# Patient Record
Sex: MALE | Race: WHITE | NOT HISPANIC OR LATINO | Employment: FULL TIME | URBAN - METROPOLITAN AREA
[De-identification: names, ages, dates, MRNs, and addresses within clinical notes are randomized per-mention and may not be internally consistent; named-entity substitution may affect disease eponyms.]

---

## 2018-01-12 NOTE — RESULT NOTES
Verified Results  * MRI LUMBAR SPINE WO CONTRAST [id number] 09:17AM Bernice Robison Order Number: [id number]     Test Name Result Flag Reference   MRI LUMBAR SPINE [address] (Report)     This is a summary report  The complete report is available in the patient's medical record  If you cannot access the medical record, please contact the sending organization for a detailed fax or copy  MRI LUMBAR SPINE WITHOUT CONTRAST     INDICATION: Weight lifting, low back pain, M54 5     COMPARISON: X-ray 1/19/2016     TECHNIQUE: Sagittal T1, sagittal T2, sagittal inversion recovery, axial T1 and axial T2, coronal T2       IMAGE QUALITY: Diagnostic     FINDINGS:     ALIGNMENT: Normal alignment of the lumbar spine  No compression fracture  No spondylolysis or spondylolisthesis  No scoliosis  MARROW SIGNAL: Neuro changes, largely reactive, chronic in nature present anteriorly at the L5-S1 level  Minor irregularity of both endplates     DISTAL CORD AND CONUS: Normal size and signal within the distal cord and conus  The conus ends at the L1 level  PARASPINAL SOFT TISSUES: Paraspinal soft tissues are unremarkable  SACRUM: Normal signal within the sacrum  No evidence of insufficiency or stress fracture  LOWER THORACIC DISC SPACES: Normal disc height and signal  No disc herniation, canal stenosis or foraminal narrowing  LUMBAR DISC SPACES:        L1-L2: Normal      L2-L3: Normal      L3-L4: Normal      L4-L5: Minor desiccation of the disc far left lateral annular fissure no definite root compression  L5-S1: Minor bilateral facet arthrosis, small marginal osteophytes, circumferential bulge       IMPRESSION:     Minor, noncompressive degenerative changes  Evolving endplate changes F8-M7  Workstation performed: [id number]     Signed by:    Dev Clemens MD   2/8/16

## 2018-01-15 NOTE — RESULT NOTES
Message   Recorded as Task   Date: 02/09/2016 08:02 AM, Created By: Arcelia Burr   Task Name: Call Patient with results   Assigned To: Cecily Justin   Regarding Patient: Hay Clark, Status: In Progress   Comment:    Domingo Jaquez - 09 Feb 2016 8:02 AM     Patient Phone: [phone]    minor non-decompressive changes, how where is patients pain   Aminta Lechuga - 11 Feb 2016 10:04 AM     TASK REASSIGNED: Previously Assigned To Domingo Jaquez Manette - 11 Feb 2016 10:11 AM     TASK EDITED  s/w pt's mother, per release of info on file  States pt is sleeping - works nights  Will c/b after noon  Provided c/b number  Will await pt's cb   Jono Castro - 11 Feb 2016 10:24 AM     TASK EDITED  s/w pt, Per pt, original pain was throughout his back - 4 mos ago  Currently, pain is in in low back to tailbone and "hip flexors"  Left worse than right  Per pt, pain is worst when he wakes up in the morning  Some improvement w/ rest and muscle relaxers  Advised pt, will d/w Dr Jordyn Batres and cb  Domingo Jaquez - 11 Feb 2016 10:32 AM     TASK REPLIED TO: Previously Assigned To Domingo Jaquez  would schedule b/l S1  tfesi X 2   Jono Castro - 11 Feb 2016 4:38 PM     TASK REASSIGNED: Previously Assigned To [facility] [place] [facility]   Cecily Justin - 15 Feb 2016 10:56 AM     TASK REASSIGNED: Previously Assigned To [facility] sched,Team   Cecily Justin - 16 Feb 2016 1:40 PM     TASK EDITED  pt scheduled for B/l s1 TFESI on 2/19/16 and in 2 wks on 3/4/2016  pt denies bld thinners/antbx aware to call office if sick or put on antbx to r/s procedures  Rosella Goldmann npo 1 hr prior   need for , wear comfortable pants   pt verbally understands instructions

## 2018-01-15 NOTE — RESULT NOTES
Message   Recorded as Task   Date: 02/22/2016 08:40 AM, Created By: [place]   Task Name: Follow Up   Assigned To: [facility]   Regarding Patient: Ahmet Bateman, Status: In Progress   Comment:    Aminta Lechuga - 22 Feb 2016 8:40 AM     TASK CREATED  Pt  s/p B/L S1 TFESI #1 on 2/19/16 w/ Dr Rosey Corea  B/L S1 TFESI #2 scheduled on 3/4/16 at 200 am w/ Dr Rosey Corea    Please contact pt  on 2/26/16     Suman Everett - 01 Mar 2016 2:46 PM     TASK EDITED  [facility] advising pt to call back   Alyssa Sanchez - 02 Mar 2016 2:47 PM     TASK EDITED  S/w woman-pt is at work, he goes in at 2:00  -ok to call back tomorrow before 2:00   Alyssa Sanchez - 07 Mar 2016 9:18 AM     TASK EDITED  Pt arruived for B/L S1 TFESI #2 scheduled on 3/4/16

## 2018-01-18 NOTE — PROGRESS NOTES
Assessment    1  Bilateral low back pain without sciatica (724 2) (M54 5)   2  Spondylosis of lumbar region without myelopathy or radiculopathy (721 3) (M47 816)   3  Myofascial pain syndrome (729 1) (M79 1)    Plan  Bilateral low back pain without sciatica    · PredniSONE 5 MG Oral Tablet   Rx By: Sherryle Lurie; Dispense: 6 Days ; #:21 Tablet; Refill: 0; For: Bilateral low back pain without sciatica; TARAH = N; Sent To: [facility]; Last Updated By: Konrad Fairchild; 2/2/2016 11:00:53 AM   · From  Hydrocodone-Ibuprofen 7 5-200 MG Oral Tablet TAKE 1 TABLET EVERY  8 HOURS AS NEEDED FOR PAIN To Hydrocodone-Ibuprofen 7 5-200 MG Oral Tablet  (Vicoprofen) TAKE 1 TABLET TWICE DAILY PRN for Pain   Rx By: Konrad Fairchild; Dispense: 30 Days ; #:60 Tablet; Refill: 0; For: Bilateral low back pain without sciatica; TARAH = N; Print Rx   · Cyclobenzaprine HCl - 10 MG Oral Tablet; TAKE 1 TABLET 3 TIMES DAILY AS  NEEDED   Rx By: Jorge Luis Bains; Dispense: 30 Days ; #:90 Tablet; Refill: 2; For: Bilateral low back pain without sciatica; TARAH = N; Verified Transmission to [facility]; Last Updated By: Konrad Fairchild; 2/2/2016 11:05:15 AM  Bilateral low back pain without sciatica, Myofascial pain syndrome    · Follow-up PRN Evaluation and Treatment  Follow-up  Status: Complete  Done:  [id number]   Ordered; For: Bilateral low back pain without sciatica, Myofascial pain syndrome; Ordered By: Konrad Fairchild Performed:  Due: [id number]  Bilateral low back pain without sciatica, Myofascial pain syndrome, Spondylosis of  lumbar region without myelopathy or radiculopathy    · Procedure Flowsheet; Status:Complete;   Done: 65CKE9772 11:59AM   Performed: In Office; Albina La; Last Updated Regine Montoya; 2/2/2016 11:59:29 AM;Ordered;   For:Bilateral low back pain without sciatica, Myofascial pain syndrome, Spondylosis of lumbar region without myelopathy or radiculopathy; Ordered By:Carlos Eduardo King;    Discussion/Summary    While the patient was in the office today, I did review with him the results of his most recent x-ray the lumbosacral spine and explained to him that did show mild degenerative changes most significantly at the L5-S1 level, however, the changes were typically normal for his age  The patient that at this point I feel it is imperative that he proceeds with the MRI of the lumbosacral spine as soon as possible so we can see what else is going on and have a better idea as to what the next step is  I advised the patient that as soon as we have the results of the MRI, our office will give him a call to review the results and discuss any other treatment primary recommendations, which may include an interlaminar lumbar epidural steroid injection with Dr Meryl Munoz versus lumbar facet joint injections  The patient was agreeable and verbalized an understanding  With regards to his medication regimen, explained to the patient that we do not prescribe medications without a baseline urine drug screen  The patient was agreeable and a baseline urine drug screen was collected at today's visit  I explained the patient that since the point of care testing results were appropriate, I would feel comfortable at this point giving him a 30 day supply of Vicoprofen one tab twice a day when necessary for breakthrough pain  However, explained to 2 the patient that I do not feel the Vicoprofen is a good long-term medication and that he is to use it on a sparing when necessary basis  The patient is to continue with the when necessary Flexeril as well  The patient was agreeable and verbalized an understanding  I encouraged the patient continue with a home exercise and stretching program as well as the home TENS unit  The patient was agreeable and verbalized an understanding  Possible side effects of new medications were reviewed with the patient/guardian today  The treatment plan was reviewed with the patient/guardian   The patient/guardian understands and agrees with the treatment plan   The patient was counseled regarding diagnostic results, instructions for management, prognosis, patient and family education, impressions, risks and benefits of treatment options and importance of compliance with treatment  total time of encounter was 25 minutes  Chief Complaint    1  Back Pain  Low back and left hip/leg pain, stable  History of Present Illness  The patient presents today for a follow-up office visit  He was seen for his initial consultation and evaluation of his left-sided greater than right low back pain with occasional left lower extremity radicular symptoms on January 19, 2016 with Dr Vasyl Xavier  Since then the patient did proceed with the x-ray of the lumbar sacral spine which did show some mild degenerative changes, however, just received the prior authorization from his insurance company in order to go ahead with the MRI, which she has not yet scheduled  However, the patient did proceed with a titrating dose of oral prednisone as Dr Vasyl Xavier had recommended, which the patient does feel has helped at least a significant sharp and burning pain and although he still has constant pain every day, it is definitely not as intense as it was prior to the prednisone and not as severe  However, he reports that he wakes up with the pain and he feels the pain slowly worsens as the day goes on  The patient reports that he has been using the when necessary Vicoprofen and Flexeril that was previously prescribed by his primary care provider, however, he was instructed to follow-up with our office if we felt continuing any pain medication would be helpful  She reports that he was trying to use the Vicoprofen on a sparing basis as evident by the fact that prescribed and was given to him over a month ago and was not a full month supply   The patient reports that he has been trying to continue with physical therapy and home exercise and stretching program as well as his home TENS unit to do everything he can do slowly and steadily get back to where he was with minimal to no back pain  The patient presents today to discuss his medication regimen and treatment plan  Susan Asencio presents with complaints of constant episodes of moderate bilateral lower back pain, described as sharp and burning, radiating to the bilateral buttock and left thigh  On a scale of 1 to 10, the patient rates the pain as 8  Symptoms are unchanged  Review of Systems    Constitutional: no fever, no recent weight gain and no recent weight loss  Eyes: no double vision and no blurry vision  Cardiovascular: no chest pain, no palpitations and no lower extremity edema  Respiratory: no complaints of shortness of breath and no wheezing  Musculoskeletal: joint stiffness and decreased range of motion, but no difficulty walking, no muscle weakness, no joint swelling, no limb swelling` and no pain in extremity  Neurological: no dizziness, no difficulty swallowing, no memory loss, no loss of consciousness and no seizures  Gastrointestinal: no nausea, no vomiting, no constipation and no diarrhea  Genitourinary: no difficulty initiating urine stream, no genital pain and no frequent urination  Integumentary: no complaints of skin rash  Psychiatric: no depression  Endocrine: no excessive thirst, no adrenal disease, no hypothyroidism and no hyperthyroidism  Hematologic/Lymphatic: no tendency for easy bruising and no tendency for easy bleeding  Active Problems    1  Abnormal liver enzymes (790 5) (R74 8)   2  Abnormal renal function (593 9) (N28 9)   3  Bilateral low back pain without sciatica (724 2) (M54 5)    Past Medical History    1  History of No significant past medical history    The active problems and past medical history were reviewed and updated today  Surgical History    The surgical history was reviewed and updated today  Family History    1   Family history of essential hypertension (V17 49) (Z82 49)   2  Family history of rheumatoid arthritis (V17 7) (Z82 61)    3  Family history of cardiac disorder (V17 49) (Z82 49)   4  Family history of malignant neoplasm (V16 9) (Z80 9)    5  Family history of Cerebrovascular accident (CVA) due to other mechanism   6  Family history of arthritis (V17 7) (Z82 61)   7  Family history of cardiac disorder (V17 49) (Z82 49)   8  Family history of diabetes mellitus (V18 0) (Z83 3)   9  Family history of essential hypertension (V17 49) (Z82 49)   10  Family history of thyroid disease (V18 19) (Z83 49)   11  Family history of Neuropathy, generalized    The family history was reviewed and updated today  Social History    · Current every day smoker (305 1) (F17 200)   · Non drinker / no alcohol use   · Single   · Smoking (305 1) (F17 200)  The social history was reviewed and updated today  The social history was reviewed and is unchanged  Current Meds   1  Cyclobenzaprine HCl - 10 MG Oral Tablet; TAKE 1 TABLET 3 TIMES DAILY AS NEEDED; Therapy: [id number] to ([facility])  Requested for: 68VBJ0197; Last   Rx:[id number] Ordered   2  Hydrocodone-Ibuprofen 7 5-200 MG Oral Tablet; TAKE 1 TABLET EVERY 8 HOURS AS   NEEDED FOR PAIN;   Therapy: 27SVE1124 to (Evaluate:24Jan2016); Last UC:[id number] Ordered   3  PredniSONE 5 MG Oral Tablet; Take as directed according to titration schedule given; Therapy: 28TLA2036 to (Evaluate:25Jan2016)  Requested for: 25XSO0332; Last   Rx:19Jan2016 Ordered    The medication list was reviewed and updated today  Allergies    1   No Known Drug Allergies    Vitals  Vital Signs [Data Includes: Current Encounter]    Recorded: 16XND3288 10:42AM   Temperature 98 5 F   Heart Rate 003   Systolic 838   Diastolic 78   Height 5 ft 10 in   Weight 216 lb 8 0 oz   BMI Calculated 31 06   BSA Calculated 2 16   Pain Scale 8     Physical Exam    Constitutional   General appearance: Well developed, well nourished, alert, in no distress, non-toxic and no overt pain behavior  Eyes   Sclera: anicteric   HEENT   Hearing grossly intact  Neck   Neck: Supple, symmetric, trachea midline, no masses  Pulmonary   Respiratory effort: Even and unlabored  Cardiovascular   Examination of extremities: No edema or pitting edema present  Skin   Skin and subcutaneous tissue: Normal without rashes or lesions, well hydrated  Psychiatric   Mood and affect: Mood and affect appropriate  Neurologic Motor Tone:    Cranial nerves: Cranial nerves II-XII grossly intact  Slightly antalgic, but steady gait without the use of any assistive devices     Musculoskeletal       Results/Data  Encounter Results   Procedure Flowsheet [id number] 11:59AM Gail Sylvester     Test Name Result Flag Reference   Urine Drug Screen Performed Date [id number]         Future Appointments    Date/Time Provider Specialty Site   02/04/2016 09:00 AM Bella Cortes [facility] Internal Medicine [facility] INTERNAL MED     Signatures   Electronically signed by : QUINCY Rascon; Feb  3 2016  7:10AM EST                       (Author)    Electronically signed by : Len Woods DO; Feb  3 2016  8:14AM EST

## 2018-01-31 ENCOUNTER — OFFICE VISIT (OUTPATIENT)
Dept: FAMILY MEDICINE CLINIC | Facility: HOSPITAL | Age: 43
End: 2018-01-31
Payer: COMMERCIAL

## 2018-01-31 VITALS
HEIGHT: 70 IN | SYSTOLIC BLOOD PRESSURE: 140 MMHG | BODY MASS INDEX: 31.21 KG/M2 | HEART RATE: 72 BPM | DIASTOLIC BLOOD PRESSURE: 80 MMHG | WEIGHT: 218 LBS

## 2018-01-31 DIAGNOSIS — R41.840 CONCENTRATION DEFICIT: ICD-10-CM

## 2018-01-31 DIAGNOSIS — F41.1 GENERALIZED ANXIETY DISORDER: Primary | ICD-10-CM

## 2018-01-31 PROCEDURE — 99214 OFFICE O/P EST MOD 30 MIN: CPT | Performed by: INTERNAL MEDICINE

## 2018-01-31 RX ORDER — ESCITALOPRAM OXALATE 10 MG/1
10 TABLET ORAL DAILY
Qty: 30 TABLET | Refills: 5 | Status: SHIPPED | OUTPATIENT
Start: 2018-01-31 | End: 2018-03-20 | Stop reason: SDUPTHER

## 2018-01-31 RX ORDER — ESCITALOPRAM OXALATE 10 MG/1
10 TABLET ORAL DAILY
Qty: 30 TABLET | Refills: 5 | Status: SHIPPED | OUTPATIENT
Start: 2018-01-31 | End: 2018-01-31 | Stop reason: SDUPTHER

## 2018-01-31 RX ORDER — CLONAZEPAM 0.5 MG/1
0.5 TABLET ORAL 2 TIMES DAILY
Qty: 60 TABLET | Refills: 0 | Status: SHIPPED | OUTPATIENT
Start: 2018-01-31 | End: 2018-03-20 | Stop reason: SDUPTHER

## 2018-01-31 NOTE — PROGRESS NOTES
Assessment/Plan:         Problem List Items Addressed This Visit        Other    Generalized anxiety disorder - Primary    Relevant Medications    clonazePAM (KlonoPIN) 0 5 mg tablet    escitalopram (LEXAPRO) 10 mg tablet    Other Relevant Orders    CBC and differential    Comprehensive metabolic panel    TSH, 3rd generation with T4 reflex      Other Visit Diagnoses     Concentration deficit                Subjective:      Patient ID: Yamilet Zavaleta is a 40 y o  male  He reports he had some anxiety in 2010 and on meds  briefly at that time but does not remember name of medication  He broke up with his girlfriend after 3 year relationship and tried breathing exercises and working out  He is having problems focusing at work and home  Has poor appetite and reports that his father and brother both have some anxiety and depression issues  He had seen counselor in past in [place]- moved here 3 years ago to help care for his mother  Working at [employer]- had to quit job 2 years ago due to back pain issues after seeing Dr Shayla Mason  Having sleep issues -g ets 3 hours then awakens and has trouble gettting back to sleep  Wakens with anxiety and feels like he has to throw up  HPI as above    The following portions of the patient's history were reviewed and updated as appropriate: current medications, past medical history and past surgical history  Review of Systems   Constitutional: Positive for appetite change and fatigue  Negative for fever  HENT: Negative  Eyes: Negative  Respiratory: Negative for apnea, cough and shortness of breath  Cardiovascular: Positive for palpitations  Feels racing heart beat in am   Gastrointestinal: Positive for nausea  Negative for abdominal distention and abdominal pain  Genitourinary: Negative  Musculoskeletal: Positive for back pain  Chronic lumbar issues- some improvement since  Change in job   Skin: Negative for rash     Neurological: Negative for dizziness, seizures, weakness and numbness  Psychiatric/Behavioral: Negative for confusion  Denies drug or alcohol abuse   All other systems reviewed and are negative  Objective:     Physical Exam   Constitutional: He is oriented to person, place, and time  He appears well-developed and well-nourished  He appears distressed  Legs moving and anxious   HENT:   Head: Normocephalic and atraumatic  Eyes: Conjunctivae are normal  Pupils are equal, round, and reactive to light  Right eye exhibits no discharge  Neck: No JVD present  Cardiovascular: Normal rate and regular rhythm  Exam reveals no gallop  No murmur heard  Pulmonary/Chest: No respiratory distress  He has no rales  Abdominal: Soft  Bowel sounds are normal    Musculoskeletal: He exhibits no edema or tenderness  Lymphadenopathy:     He has no cervical adenopathy  Neurological: He is alert and oriented to person, place, and time  Coordination normal    Skin: Skin is warm and dry  No erythema  Psychiatric: Judgment and thought content normal    Speech is clear, good eye contact   Nursing note and vitals reviewed

## 2018-03-20 ENCOUNTER — TELEPHONE (OUTPATIENT)
Dept: FAMILY MEDICINE CLINIC | Facility: HOSPITAL | Age: 43
End: 2018-03-20

## 2018-03-20 ENCOUNTER — OFFICE VISIT (OUTPATIENT)
Dept: FAMILY MEDICINE CLINIC | Facility: HOSPITAL | Age: 43
End: 2018-03-20
Payer: COMMERCIAL

## 2018-03-20 VITALS
WEIGHT: 207 LBS | BODY MASS INDEX: 29.63 KG/M2 | HEART RATE: 72 BPM | SYSTOLIC BLOOD PRESSURE: 140 MMHG | HEIGHT: 70 IN | TEMPERATURE: 95.3 F | RESPIRATION RATE: 16 BRPM | DIASTOLIC BLOOD PRESSURE: 72 MMHG

## 2018-03-20 DIAGNOSIS — J40 TRACHEOBRONCHITIS: ICD-10-CM

## 2018-03-20 DIAGNOSIS — F41.1 GENERALIZED ANXIETY DISORDER: Primary | ICD-10-CM

## 2018-03-20 DIAGNOSIS — J01.10 ACUTE NON-RECURRENT FRONTAL SINUSITIS: ICD-10-CM

## 2018-03-20 PROCEDURE — 99213 OFFICE O/P EST LOW 20 MIN: CPT | Performed by: INTERNAL MEDICINE

## 2018-03-20 RX ORDER — ESCITALOPRAM OXALATE 20 MG/1
20 TABLET ORAL DAILY
Qty: 90 TABLET | Refills: 1 | Status: SHIPPED | OUTPATIENT
Start: 2018-03-20 | End: 2018-07-19 | Stop reason: SDUPTHER

## 2018-03-20 RX ORDER — CLONAZEPAM 0.5 MG/1
0.5 TABLET ORAL 2 TIMES DAILY
Qty: 60 TABLET | Refills: 0 | Status: SHIPPED | OUTPATIENT
Start: 2018-03-20 | End: 2018-06-25 | Stop reason: SDUPTHER

## 2018-03-20 RX ORDER — AZITHROMYCIN 250 MG/1
250 TABLET, FILM COATED ORAL DAILY
Qty: 6 TABLET | Refills: 0 | Status: SHIPPED | OUTPATIENT
Start: 2018-03-20 | End: 2018-03-20 | Stop reason: SDUPTHER

## 2018-03-20 RX ORDER — AZITHROMYCIN 250 MG/1
250 TABLET, FILM COATED ORAL DAILY
Qty: 6 TABLET | Refills: 0 | Status: SHIPPED | OUTPATIENT
Start: 2018-03-20 | End: 2018-03-25

## 2018-03-20 NOTE — ASSESSMENT & PLAN NOTE
Started on lexapro with last visit- feels that his panic feeling in am has decreased somewhat  Wears off somehat as it wears off later in day- discussed increase in dose to see if that improves symptoms    Had not picked  Up rx  For the klonipin- will refill for prn use

## 2018-03-20 NOTE — PROGRESS NOTES
Assessment/Plan:     Problem List Items Addressed This Visit        Other    Generalized anxiety disorder - Primary     Started on lexapro with last visit- feels that his panic feeling in am has decreased somewhat  Wears off somehat as it wears off later in day- discussed increase in dose to see if that improves symptoms  Had not picked  Up rx  For the klonipin- will refill for prn use             Other Visit Diagnoses     Acute non-recurrent frontal sinusitis                Subjective:      Patient ID: Alfonso Read is a 44 y o  male    1 uri- had sinus congestion and some cough for 7 days- had some discolored draiange 2 days ago and no fever- uses mucinex/ nyquil  But still having issues  Still occasional cigarettes        The following portions of the patient's history were reviewed and updated as appropriate: allergies, current medications and problem list      Review of Systems   Constitutional: Positive for fatigue  Negative for chills and fever  HENT: Positive for congestion, ear pain, sinus pressure and sore throat  Respiratory: Positive for cough  Gastrointestinal: Negative for nausea  All other systems reviewed and are negative  Objective:      Current Outpatient Prescriptions:     clonazePAM (KlonoPIN) 0 5 mg tablet, Take 1 tablet (0 5 mg total) by mouth 2 (two) times a day, Disp: 60 tablet, Rfl: 0    escitalopram (LEXAPRO) 10 mg tablet, Take 1 tablet (10 mg total) by mouth daily, Disp: 30 tablet, Rfl: 5    /72   Pulse 90   Resp 16   Ht 5' 3" (1 6 m)   Wt 65 3 kg (144 lb)   BMI 25 51 kg/m²          Physical Exam   Constitutional: He appears well-developed  He appears distressed  intermittant cough   HENT:   Head: Normocephalic  Left Ear: External ear normal    Mouth/Throat: Oropharynx is clear and moist    Mild phayrngeal injection   Eyes: Right eye exhibits no discharge  Left eye exhibits no discharge     Neck:   Tender submandibular lymphadenoapthy bilaterally Cardiovascular: Normal rate and regular rhythm  Exam reveals no friction rub  No murmur heard  Pulmonary/Chest: He has no rales  He exhibits no tenderness  Harsh upper airwya cough with some forced end expiratory wheezes   Lymphadenopathy:     He has cervical adenopathy

## 2018-06-25 ENCOUNTER — OFFICE VISIT (OUTPATIENT)
Dept: FAMILY MEDICINE CLINIC | Facility: HOSPITAL | Age: 43
End: 2018-06-25
Payer: COMMERCIAL

## 2018-06-25 VITALS
WEIGHT: 212 LBS | SYSTOLIC BLOOD PRESSURE: 128 MMHG | HEIGHT: 70 IN | DIASTOLIC BLOOD PRESSURE: 78 MMHG | HEART RATE: 64 BPM | BODY MASS INDEX: 30.35 KG/M2

## 2018-06-25 DIAGNOSIS — M79.641 PAIN IN BOTH HANDS: ICD-10-CM

## 2018-06-25 DIAGNOSIS — F41.1 GENERALIZED ANXIETY DISORDER: Primary | ICD-10-CM

## 2018-06-25 DIAGNOSIS — M79.642 PAIN IN BOTH HANDS: ICD-10-CM

## 2018-06-25 DIAGNOSIS — M51.26 BULGING LUMBAR DISC: ICD-10-CM

## 2018-06-25 PROCEDURE — 99214 OFFICE O/P EST MOD 30 MIN: CPT | Performed by: INTERNAL MEDICINE

## 2018-06-25 PROCEDURE — 3008F BODY MASS INDEX DOCD: CPT | Performed by: INTERNAL MEDICINE

## 2018-06-25 RX ORDER — CLONAZEPAM 0.5 MG/1
0.5 TABLET ORAL 2 TIMES DAILY
Qty: 60 TABLET | Refills: 0 | Status: SHIPPED | OUTPATIENT
Start: 2018-06-25

## 2018-06-25 RX ORDER — MELOXICAM 15 MG/1
15 TABLET ORAL DAILY
Qty: 30 TABLET | Refills: 5 | Status: SHIPPED | OUTPATIENT
Start: 2018-06-25

## 2018-06-25 NOTE — ASSESSMENT & PLAN NOTE
Has ongoing pain and some pain in feet at times  Works on hard floor and has some shooting pain down right buttocks  Busy with job demands  Using advil  And fish oil

## 2018-06-25 NOTE — PROGRESS NOTES
Assessment/Plan:           Problem List Items Addressed This Visit        Musculoskeletal and Integument    Bulging lumbar disc     Has ongoing pain and some pain in feet at times  Works on hard floor and has some shooting pain down right buttocks  Busy with job demands  Using advil  And fish oil  Other    Generalized anxiety disorder - Primary     Improving control- feels tired after working hard this week  Not using the klonipin often- prn  Has been taking the lexapro daily           Other Visit Diagnoses     Pain in both hands        Relevant Medications    meloxicam (MOBIC) 15 mg tablet    Other Relevant Orders    CAMILLE Screen w/ Reflex to Titer/Pattern    Sedimentation rate, automated    RF Screen w/ Reflex to Titer    LYME INDEX IGG/IGM, CSF            Subjective:      Patient ID: Army Rinaldi is a 44 y o  male    1  Back pain- continue with gym workout for core muscles  Some hand pain with work- has some swelling in hands and base of thumb bilaterally  2  Anxiety- doing well on lexapro 20 mg daily- not needing extra klonipin except prn use  3  Hand pain- will give meloxicam trial        The following portions of the patient's history were reviewed and updated as appropriate: allergies, current medications and problem list      Review of Systems   Constitutional: Negative for activity change and appetite change  HENT: Negative for congestion and ear discharge  Respiratory: Negative for apnea and chest tightness  Musculoskeletal: Positive for arthralgias and back pain  Neurological: Negative for dizziness and facial asymmetry  All other systems reviewed and are negative          Objective:      Current Outpatient Prescriptions:     clonazePAM (KlonoPIN) 0 5 mg tablet, Take 1 tablet (0 5 mg total) by mouth 2 (two) times a day, Disp: 60 tablet, Rfl: 0    escitalopram (LEXAPRO) 20 mg tablet, Take 1 tablet (20 mg total) by mouth daily for 90 days, Disp: 90 tablet, Rfl: 1   meloxicam (MOBIC) 15 mg tablet, Take 1 tablet (15 mg total) by mouth daily, Disp: 30 tablet, Rfl: 5         Physical Exam   Constitutional: He is oriented to person, place, and time  He appears well-developed and well-nourished  No distress  HENT:   Head: Normocephalic  Right Ear: External ear normal    Left Ear: External ear normal    Eyes: EOM are normal  Right eye exhibits no discharge  Left eye exhibits no discharge  Neck: No JVD present  Cardiovascular: Normal rate, regular rhythm and normal heart sounds  No murmur heard  Pulmonary/Chest: Effort normal and breath sounds normal  No stridor  No respiratory distress  He has no wheezes  Abdominal: Soft  Bowel sounds are normal  He exhibits no distension  There is no tenderness  Musculoskeletal: He exhibits tenderness  He exhibits no edema  Base of thumb arthritis   Neurological: He is alert and oriented to person, place, and time  No cranial nerve deficit  Coordination normal    Skin: Skin is warm and dry  No erythema  Psychiatric: He has a normal mood and affect  His behavior is normal    Some pressured speech   Nursing note and vitals reviewed

## 2018-06-25 NOTE — ASSESSMENT & PLAN NOTE
Improving control- feels tired after working hard this week  Not using the klonipin often- prn   Has been taking the lexapro daily

## 2018-07-19 DIAGNOSIS — F41.1 GENERALIZED ANXIETY DISORDER: ICD-10-CM

## 2018-07-19 RX ORDER — ESCITALOPRAM OXALATE 20 MG/1
20 TABLET ORAL DAILY
Qty: 90 TABLET | Refills: 0 | Status: SHIPPED | OUTPATIENT
Start: 2018-07-19 | End: 2018-10-17

## 2021-03-26 ENCOUNTER — OFFICE VISIT (OUTPATIENT)
Dept: URGENT CARE | Facility: CLINIC | Age: 46
End: 2021-03-26

## 2021-03-26 VITALS
RESPIRATION RATE: 18 BRPM | TEMPERATURE: 95.6 F | OXYGEN SATURATION: 97 % | WEIGHT: 215 LBS | BODY MASS INDEX: 31.84 KG/M2 | HEART RATE: 86 BPM | HEIGHT: 69 IN

## 2021-03-26 DIAGNOSIS — J20.9 ACUTE BRONCHITIS, UNSPECIFIED ORGANISM: Primary | ICD-10-CM

## 2021-03-26 PROCEDURE — 99213 OFFICE O/P EST LOW 20 MIN: CPT | Performed by: FAMILY MEDICINE

## 2021-03-26 RX ORDER — PREDNISONE 20 MG/1
40 TABLET ORAL DAILY
Qty: 10 TABLET | Refills: 0 | Status: SHIPPED | OUTPATIENT
Start: 2021-03-26 | End: 2021-03-31

## 2021-03-26 RX ORDER — AZITHROMYCIN 250 MG/1
TABLET, FILM COATED ORAL
Qty: 6 TABLET | Refills: 0 | Status: SHIPPED | OUTPATIENT
Start: 2021-03-26 | End: 2021-03-30

## 2021-03-26 NOTE — PROGRESS NOTES
[facility]        NAME: Mu Saldana is a 40 y o  male  : 1975    MRN: [id number]  DATE: 2021  TIME: 7:49 PM    Assessment and Plan   Acute bronchitis, unspecified organism [J20 9]  1  Acute bronchitis, unspecified organism  azithromycin (ZITHROMAX) 250 mg tablet    predniSONE 20 mg tablet         Patient Instructions       Follow up with PCP in 3-5 days  Proceed to  ER if symptoms worsen  Chief Complaint     Chief Complaint   Patient presents with    Cough     Cough began Saturday: tested negative for COVID-19, "just got the results " Cough persists  Took Robitussin yesterday at 0730  History of Present Illness         45-year-old male with a one-week history of productive cough  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 and the cord air makes symptoms worse  He was recently tested for COVID which was negative  Denies any fevers chills  Denies any headaches, nausea vomiting      Review of Systems   Review of Systems   Constitutional: Negative  Negative for fever  HENT: Positive for congestion  Eyes: Negative  Respiratory: Positive for cough  Cardiovascular: Negative  Gastrointestinal: Negative  Genitourinary: Negative  Musculoskeletal: Positive for arthralgias and myalgias  Skin: Negative  Allergic/Immunologic: Negative  Neurological: Negative  Hematological: Negative  Psychiatric/Behavioral: Negative            Current Medications       Current Outpatient Medications:     azithromycin (ZITHROMAX) 250 mg tablet, Take 2 tablets today then 1 tablet daily x 4 days, Disp: 6 tablet, Rfl: 0    clonazePAM (KlonoPIN) 0 5 mg tablet, Take 1 tablet (0 5 mg total) by mouth 2 (two) times a day, Disp: 60 tablet, Rfl: 0    escitalopram (LEXAPRO) 20 mg tablet, Take 1 tablet (20 mg total) by mouth daily for 90 days, Disp: 90 tablet, Rfl: 0    meloxicam (MOBIC) 15 mg tablet, Take 1 tablet (15 mg total) by mouth daily, Disp: 30 tablet, Rfl: 5    predniSONE 20 mg tablet, Take 2 tablets (40 mg total) by mouth daily for 5 days, Disp: 10 tablet, Rfl: 0    Current Allergies     Allergies as of 03/26/2021    (No Known Allergies)            The following portions of the patient's history were reviewed and updated as appropriate: allergies, current medications, past family history, past medical history, past social history, past surgical history and problem list      Past Medical History:   Diagnosis Date    Anxiety     Depression        No past surgical history on file  Family History   Problem Relation Age of Onset    Other Family         CVA-due to other mechanism    Arthritis Family     Heart disease Family         cardiac disorder    Diabetes Family     Hypertension Family     Thyroid disease Family     Neuropathy Family         Generalized    Hypertension Mother     Rheum arthritis Mother     Heart disease Father         cardiac disorder    Cancer Father     Substance Abuse Neg Hx     Mental illness Neg Hx          Medications have been verified  Objective   Pulse 86   Temp (!) 95 6 °F (35 3 °C)   Resp 18   Ht 5' 9" (1 753 m)   Wt 97 5 kg (215 lb)   SpO2 97%   BMI 31 75 kg/m²   No LMP for male patient  Physical Exam     Physical Exam  Constitutional:       Appearance: He is well-developed  HENT:      Head: Normocephalic  Eyes:      Pupils: Pupils are equal, round, and reactive to light  Neck:      Musculoskeletal: Normal range of motion  Pulmonary:      Effort: Pulmonary effort is normal    Musculoskeletal: Normal range of motion  Skin:     General: Skin is warm  Neurological:      Mental Status: He is alert and oriented to person, place, and time

## 2021-03-31 DIAGNOSIS — Z23 ENCOUNTER FOR IMMUNIZATION: ICD-10-CM

## 2021-04-16 ENCOUNTER — IMMUNIZATIONS (OUTPATIENT)
Dept: FAMILY MEDICINE CLINIC | Facility: HOSPITAL | Age: 46
End: 2021-04-16

## 2021-04-16 DIAGNOSIS — Z23 ENCOUNTER FOR IMMUNIZATION: Primary | ICD-10-CM

## 2021-04-16 PROCEDURE — 0001A SARS-COV-2 / COVID-19 MRNA VACCINE (PFIZER-BIONTECH) 30 MCG: CPT

## 2021-04-16 PROCEDURE — 91300 SARS-COV-2 / COVID-19 MRNA VACCINE (PFIZER-BIONTECH) 30 MCG: CPT

## 2021-05-11 ENCOUNTER — IMMUNIZATIONS (OUTPATIENT)
Dept: FAMILY MEDICINE CLINIC | Facility: HOSPITAL | Age: 46
End: 2021-05-11

## 2021-05-11 DIAGNOSIS — Z23 ENCOUNTER FOR IMMUNIZATION: Primary | ICD-10-CM

## 2021-05-11 PROCEDURE — 0002A SARS-COV-2 / COVID-19 MRNA VACCINE (PFIZER-BIONTECH) 30 MCG: CPT

## 2021-05-11 PROCEDURE — 91300 SARS-COV-2 / COVID-19 MRNA VACCINE (PFIZER-BIONTECH) 30 MCG: CPT

## 2022-09-29 ENCOUNTER — OFFICE VISIT (OUTPATIENT)
Dept: FAMILY MEDICINE CLINIC | Facility: HOSPITAL | Age: 47
End: 2022-09-29
Payer: COMMERCIAL

## 2022-09-29 VITALS
OXYGEN SATURATION: 98 % | DIASTOLIC BLOOD PRESSURE: 72 MMHG | WEIGHT: 227 LBS | BODY MASS INDEX: 33.62 KG/M2 | HEART RATE: 69 BPM | HEIGHT: 69 IN | SYSTOLIC BLOOD PRESSURE: 100 MMHG

## 2022-09-29 DIAGNOSIS — M79.18 MYOFASCIAL PAIN SYNDROME: ICD-10-CM

## 2022-09-29 DIAGNOSIS — Z13.0 SCREENING FOR DEFICIENCY ANEMIA: ICD-10-CM

## 2022-09-29 DIAGNOSIS — Z11.59 NEED FOR HEPATITIS C SCREENING TEST: ICD-10-CM

## 2022-09-29 DIAGNOSIS — Z11.4 ENCOUNTER FOR SCREENING FOR HIV: ICD-10-CM

## 2022-09-29 DIAGNOSIS — M47.26 OSTEOARTHRITIS OF SPINE WITH RADICULOPATHY, LUMBAR REGION: ICD-10-CM

## 2022-09-29 DIAGNOSIS — M51.36 BULGING LUMBAR DISC: Primary | ICD-10-CM

## 2022-09-29 DIAGNOSIS — F41.1 GENERALIZED ANXIETY DISORDER: ICD-10-CM

## 2022-09-29 DIAGNOSIS — Z13.220 SCREENING FOR LIPID DISORDERS: ICD-10-CM

## 2022-09-29 DIAGNOSIS — Z13.29 THYROID DISORDER SCREENING: ICD-10-CM

## 2022-09-29 DIAGNOSIS — M79.641 PAIN IN BOTH HANDS: ICD-10-CM

## 2022-09-29 DIAGNOSIS — M79.642 PAIN IN BOTH HANDS: ICD-10-CM

## 2022-09-29 DIAGNOSIS — M25.572 ACUTE LEFT ANKLE PAIN: ICD-10-CM

## 2022-09-29 PROCEDURE — 99214 OFFICE O/P EST MOD 30 MIN: CPT | Performed by: NURSE PRACTITIONER

## 2022-09-29 RX ORDER — DULOXETIN HYDROCHLORIDE 30 MG/1
30 CAPSULE, DELAYED RELEASE ORAL DAILY
Qty: 30 CAPSULE | Refills: 1 | Status: SHIPPED | OUTPATIENT
Start: 2022-09-29

## 2022-09-29 RX ORDER — MELOXICAM 15 MG/1
15 TABLET ORAL DAILY
Qty: 30 TABLET | Refills: 5 | Status: SHIPPED | OUTPATIENT
Start: 2022-09-29

## 2022-09-29 RX ORDER — HYDROXYZINE 50 MG/1
50 TABLET, FILM COATED ORAL 3 TIMES DAILY PRN
Qty: 30 TABLET | Refills: 0 | Status: SHIPPED | OUTPATIENT
Start: 2022-09-29

## 2022-09-29 NOTE — PATIENT INSTRUCTIONS
Get your labwork done for your visit  Schedule physical therapy  Take new medications as ordered  Start limiting caffeine  Next visit we will tackle smoking cessation

## 2022-09-29 NOTE — PROGRESS NOTES
[facility]    Assessment/Plan:      Diagnosis ICD-10-CM Associated Orders   1  Bulging lumbar disc  M51 26 meloxicam (MOBIC) 15 mg tablet   2  Myofascial pain syndrome  M79 18 DULoxetine (Cymbalta) 30 mg delayed release capsule   3  Osteoarthritis of spine with radiculopathy, lumbar region  M47 26 meloxicam (MOBIC) 15 mg tablet     DULoxetine (Cymbalta) 30 mg delayed release capsule     Ambulatory Referral to Physical Therapy     Comprehensive metabolic panel   4  Generalized anxiety disorder  F41 1 DULoxetine (Cymbalta) 30 mg delayed release capsule     Comprehensive metabolic panel     hydrOXYzine HCL (ATARAX) 50 mg tablet   5  Pain in both hands  M79 641     M79 642    6  Acute left ankle pain  M25 572 XR ankle 3+ vw left   7  Screening for deficiency anemia  Z13 0 CBC and differential   8  Screening for lipid disorders  Z13 220 Lipid panel   9  Thyroid disorder screening  Z13 29 TSH, 3rd generation with Free T4 reflex   10  Need for hepatitis C screening test  Z11 59 Hepatitis C antibody   11  Encounter for screening for HIV  Z11 4 Human Immunodeficiency Virus 1/2 Antigen / Antibody ( Fourth Generation) with Reflex Testing      Get your labwork done for your next visit   Schedule physical therapy and ankle xray   Take new medications as ordered   Start limiting caffeine   Next visit we will tackle smoking cessation  Return in about 1 month (around 10/29/2022) for Annual physical    Patient may call or return to office with any questions or concerns  ______________________________________________________________________  Subjective:     Patient ID: Walter Ernandez is a 56 y o  male  Mikel Palumbo is here to re establish care  He did not have insurance thus he had to stop his anxiety as well as arthritic medications  He reports both conditions are causing him distress  He also now has ankle pain, unsure of onset but thinks he injured it jumping over a fence    He did not seek diagnostic care at the time due to lack of insurance  He reports his back pain causing radiculopathy of his BLE as well as MARCIANO  He denies saddle paresthesia  He continues to drink caffeine and smoke 10 cigarettes daily  He works nightshift which causes sleep disturbance  He has two little girls and a great support system  [address] [facility] [address]  Chief Complaint   Patient presents with    Back Pain    Anxiety                The following portions of the patient's history were reviewed and updated as appropriate: allergies, current medications, past family history, past medical history, past social history, past surgical history and problem list     Review of Systems   Constitutional: Negative  Negative for activity change, appetite change, chills, fatigue and fever  HENT: Negative  Negative for congestion, ear pain, postnasal drip and sinus pain  Eyes: Negative  Respiratory: Negative  Negative for cough, chest tightness and shortness of breath  Cardiovascular: Negative  Negative for chest pain and leg swelling  Gastrointestinal: Negative  Negative for constipation and diarrhea  Endocrine: Negative  Genitourinary: Negative  Negative for dysuria  Musculoskeletal: Positive for arthralgias, back pain and gait problem  Skin: Negative  Allergic/Immunologic: Negative  Negative for immunocompromised state  Neurological: Negative for dizziness and light-headedness  Hematological: Negative  Psychiatric/Behavioral: Positive for sleep disturbance  The patient is nervous/anxious  Objective:      Vitals:    09/29/22 1411   BP: 100/72   Pulse: 69   SpO2: 98%      Physical Exam  Vitals and nursing note reviewed  Constitutional:       Appearance: Normal appearance  HENT:      Head: Normocephalic and atraumatic        Right Ear: Tympanic membrane, ear canal and external ear normal       Left Ear: Tympanic membrane, ear canal and external ear normal  Nose: Nose normal       Mouth/Throat:      Mouth: Mucous membranes are moist       Pharynx: Oropharynx is clear  Eyes:      Extraocular Movements: Extraocular movements intact  Conjunctiva/sclera: Conjunctivae normal       Pupils: Pupils are equal, round, and reactive to light  Cardiovascular:      Rate and Rhythm: Normal rate and regular rhythm  Pulses: Normal pulses  Heart sounds: Normal heart sounds  Pulmonary:      Effort: Pulmonary effort is normal       Breath sounds: Normal breath sounds  Abdominal:      General: Bowel sounds are normal       Palpations: Abdomen is soft  Musculoskeletal:      Cervical back: Normal range of motion and neck supple  Pain with movement present  Lumbar back: Bony tenderness present  No edema  Right lower leg: No edema  Left lower leg: No edema  Left ankle: Decreased range of motion  Skin:     General: Skin is warm and dry  Neurological:      General: No focal deficit present  Mental Status: He is alert and oriented to person, place, and time  Gait: Gait abnormal       Comments: Antalgic gait   Psychiatric:         Mood and Affect: Mood is anxious  Speech: Speech is rapid and pressured  Behavior: Behavior normal  Behavior is cooperative  Thought Content: Thought content normal          Judgment: Judgment normal            Portions of the record may have been created with voice recognition software  Occasional wrong word or "sound alike" substitutions may have occurred due to the inherent limitations of voice recognition software  Please review the chart carefully and recognize, using context, where substitutions/typographical errors may have occurred

## 2022-10-04 ENCOUNTER — EVALUATION (OUTPATIENT)
Dept: PHYSICAL THERAPY | Facility: CLINIC | Age: 47
End: 2022-10-04
Payer: COMMERCIAL

## 2022-10-04 DIAGNOSIS — M47.26 OSTEOARTHRITIS OF SPINE WITH RADICULOPATHY, LUMBAR REGION: Primary | ICD-10-CM

## 2022-10-04 PROCEDURE — 97110 THERAPEUTIC EXERCISES: CPT | Performed by: PHYSICAL THERAPIST

## 2022-10-04 PROCEDURE — 97162 PT EVAL MOD COMPLEX 30 MIN: CPT | Performed by: PHYSICAL THERAPIST

## 2022-10-04 NOTE — PROGRESS NOTES
PT Evaluation     Today's date: 10/4/2022  Patient name: Sue Ochoa  : 1975  MRN: [id number]  Referring provider: QUINCY Mcmullen  Dx:   Encounter Diagnosis     ICD-10-CM    1  Osteoarthritis of spine with radiculopathy, lumbar region  M47 26                   Assessment  Assessment details: Sue Ochoa is a 56 y o  male presenting to outpatient physical therapy with chief complaints of (R) posterior thigh pain and (L) ankle pain  Patient describes insidious onset of pain over 2 years ago with fluctuating symptoms since  Patient displays with abnormal gait, restricted lumbar ROM, no myotomal weakness, (+) SLR, HS flexibility restriction (B), mechanically inconclusive with repeated movements - extension bias likely, and functional restrictions  Patient's symptoms are multifactorial in nature with a primary movement diagnosis of lumbar hypomobility resulting in pathoanatomical signs and symptoms consistent with Osteoarthritis of spine with radiculopathy, lumbar region  (primary encounter diagnosis) resulting in limitations in his ability to manage symptoms independently and perform workouts without increased pain  No further referral appears necessary at this time based upon examination results however if symptoms are not improving in 4 weeks referral to pain management will be considered  Please contact me if you have any questions ([phone])  Thank you for the opportunity to share in the care of this patient  Impairments: abnormal gait, abnormal or restricted ROM, activity intolerance, impaired physical strength, lacks appropriate home exercise program and pain with function    Symptom irritability: highUnderstanding of Dx/Px/POC: fair   Prognosis: fair  Prognosis details: Positive prognostic indicators include positive attitude toward recovery, motivated to improve    Negative prognostic indicators include chronicity of symptoms, high symptom irritability, fear avoidance, anxiety  Goals  STG to be met in 3 weeks:  - Increase Lumbar AROM: minimal restriction all planes  - Increase (B) HS flexibility by 10 degrees  - I with HEP   - Patient will be able to return to modified workout - cardio and light weightlifting without pain  LTG to be met in 6 weeks:  - Increase (B) HS flexibility by 15 degrees  - I with updated HEP   - Patient will be able to manage symptoms independently  - Patient will be able to perform full workout without increased pain  - Patient will be able to walk over 2 miles without increased pain  Plan  Plan details: Prognosis above is given PT services 2x/week tapering to 1x/week over the next 6 weeks and home program adherence  Patient would benefit from: skilled physical therapy  Planned modality interventions: cryotherapy and thermotherapy: hydrocollator packs  Planned therapy interventions: activity modification, joint mobilization, manual therapy, neuromuscular re-education, patient education, postural training, strengthening, stretching, therapeutic exercise, therapeutic activities, functional ROM exercises, home exercise program, gait training and body mechanics training  Plan of Care beginning date: 10/4/2022  Plan of Care expiration date: 11/15/2022  Treatment plan discussed with: patient        Subjective Evaluation    History of Present Illness  Mechanism of injury: At Evaluation (October 4, 2022): Patient onset of pain over 2 years ago - (R) sided lower back pain with radiating pain into the back of leg  At the time he did not have insurance - managed symptoms with activity modification and hot shower  He also notes having pain in the (L) shin and ankle  He reports his pain has fluctuated from (R) side to (L) side but pain has persisted  He saw a new PCP - recommended having an x-ray for the (L) ankle and starting PT for the lumbar spine       Red Flag Screen:  Patient denies recent fever, changes in bowel and bladder function, nausea and vomiting, unexplained weight loss, pain with coughing, or traumatic CANDACE   Patient reports intermittent weakness in (L) ankle, intermittent numbness and tingling in (R) leg       Greatest concern: find out what is going on - why am I getting all of this pain    Quality of life: good    Pain  Current pain ratin  At best pain ratin  At worst pain rating: 10  Location: (R) posterior thigh to the knee, posterior (L) leg to the ankle   Quality: tight, dull ache, radiating and sharp    Social Support  Lives in: condominium  Lives with: parents, significant other and young children    Employment status: working ([employer] - mainly standing )    Diagnostic Tests  No diagnostic tests performed  Treatments  Previous treatment: medication and injection treatment  Patient Goals  Patient goal: Patient Specific Functional Scale: return to working out at [employer] - cardio, weight lifting 0/10, learn ways to manage his symptoms 0/10, return to walking over 2 miles without pain 0/10; Total 0/30        Objective     Static Posture   General Observations  Symmetrical weight bearing  Postural Observations  Seated posture: fair  Standing posture: good        Palpation     Additional Palpation Details  No TTP throughout lumbar spine or (R) posterior thigh    Active Range of Motion     Lumbar   Flexion:  with pain Restriction level: moderate  Extension:  Restriction level: minimal    Additional Active Range of Motion Details  (B) SG: minimal restriction - no change in pain     Strength/Myotome Testing     Left Hip   Planes of Motion   Flexion: 4+    Right Hip   Planes of Motion   Flexion: 4+    Left Knee   Flexion: 5  Extension: 5    Right Knee   Flexion: 5  Extension: 5    Left Ankle/Foot   Dorsiflexion: 5  Plantar flexion: 4+  Great toe extension: 5    Right Ankle/Foot   Dorsiflexion: 5  Plantar flexion: 4+  Great toe extension: 5    Tests     Lumbar   Negative SIJ compression and sacroiliac distraction       Left Positive passive SLR  Negative crossed SLR and slump test      Right   Positive passive SLR  Negative crossed SLR and slump test      Left Hip   Negative HARSHIL and FADIR  Right Hip   Negative HARSHIL and FADIR  Additional Tests Details  Repeated Movement Testing:   Repeated Flexion in Standing: Increased, Worse  Repeated Extension in Standing: NE, NE  Repeated Flexion in Lying:   Repeated Extension in Lying:     Static Position Testing:   Prone position: Decreased pain   Prone on elbows: Decreased pain       90/90 HS Lag: (B) 45 degrees    (L) Ankle PROM: WNL all planes - increased pain with EV    Ambulation     Observational Gait   Gait: antalgic   Decreased walking speed, left step length and right step length  Left foot contact pattern: foot flat  Right foot contact pattern: foot flat    Additional Observational Gait Details  Ambulation appears guarded    Functional Assessment        Comments  Squat: good depth - no weight shift             Daily Treatment Diary     DX: Lumbar radiculopathy  EPOC: 11/16/22  Follow Up with Referring Provider:   Precautions: NA  CO-MORBIDITIES: Anxiety and Depression  HEP ACCESS CODE: [id number]       Stage: chronic  Stability of Symptoms:  At Evaluation: stable - unchanged  Global Rating of Change:   Symptom Irritability Level: high  Primary Movement Diagnosis: lumbar hypomobility   Patient Specific Functional Scale:   10/4/22: return to working out at the gym - cardio, weight lifting 0/10, learn ways to manage his symptoms 0/10, return to walking over 2 miles without pain 0/10;  Total 0/30  FOTO Prediction: 65 by visit 11   FOTO Progress: 56 at evaluation   Greatest Concern: find out what is going on - why am I getting all of this pain  Current Activity Plan: added to HEP (10/4)  Current Educational Needs: progressions      Treatment Diary          Manual Therapy         Lumbar Mobs                                                               Therapeutic Exercise  HEP Treadmill                    Prone on Elbows  10/4         Prone Press Up 10/4         Standing Lumbar Ext 10/4                   Abdominal Brace          LFS: Alt LE          H/L Hip ADD Isometric          BKFO                    Neuromuscular Reeducation          TB Counter Balance                     TB Trunk Rotation                     FWD Mini Lunge          LAT Mini Lunge                    Quadruped  Alt UE          Alt LE          Opp UE/LE                    Therapeutic Activity                              Gait Training                                        Modalities

## 2022-10-10 ENCOUNTER — OFFICE VISIT (OUTPATIENT)
Dept: PHYSICAL THERAPY | Facility: CLINIC | Age: 47
End: 2022-10-10
Payer: COMMERCIAL

## 2022-10-10 DIAGNOSIS — M47.26 OSTEOARTHRITIS OF SPINE WITH RADICULOPATHY, LUMBAR REGION: Primary | ICD-10-CM

## 2022-10-10 PROCEDURE — 97110 THERAPEUTIC EXERCISES: CPT | Performed by: PHYSICAL THERAPIST

## 2022-10-10 PROCEDURE — 97140 MANUAL THERAPY 1/> REGIONS: CPT | Performed by: PHYSICAL THERAPIST

## 2022-10-10 NOTE — PROGRESS NOTES
Daily Note     Today's date: 10/10/2022  Patient name: Solitario Askew  : 1975  MRN: [id number]  Referring provider: QUINCY Botello  Dx:   Encounter Diagnosis     ICD-10-CM    1  Osteoarthritis of spine with radiculopathy, lumbar region  M47 26                   Subjective: Patient reports good tolerance to his LV  He reports taking things easier this weekend  He notes his ankle is feeling better  He has been performing his HEP 1x per day but only 2 days since IE  He continues to have some tightness in the (R) leg  Objective: See treatment diary below      Assessment:   Stage: chronic  Stability of Symptoms:  At Evaluation: stable - unchanged  Global Rating of Change:   Symptom Irritability Level: high  Primary Movement Diagnosis: lumbar hypomobility   Patient Specific Functional Scale:   10/4/22: return to working out at the gym - cardio, weight lifting 0/10, learn ways to manage his symptoms 0/10, return to walking over 2 miles without pain 0/10; Total 030  FOTO Prediction: 65 by visit 11   FOTO Progress: 56 at evaluation   Greatest Concern: find out what is going on - why am I getting all of this pain  Current Activity Plan: added to HEP (10/4); perform HEP 5x per day (10/10)  Current Educational Needs: progressions    Patient had good tolerance to manual treatment  He had no complaints of ankle pain throughout treatment  He required cuing for form with previously issued HEP  He reported feeling much better post treatment - no pain in the leg    Skilled PT continues to be required to guide progression of lumbar mobility and strength to allow him to return to walking regularly and performing a regular gym exercise program         Plan: Continue with POC - monitor tolerance to treatment - progress as able NV     Daily Treatment Diary     DX: Lumbar radiculopathy  EPOC: 22  Follow Up with Referring Provider:   Precautions: NA  CO-MORBIDITIES: Anxiety and Depression  HEP ACCESS CODE: [id number]       Treatment Diary  10/10        Manual Therapy         Lumbar Mobs PA  Gr 2/3  8 min                                                              Therapeutic Exercise  HEP         Treadmill  2 0 mph  5 min                  Prone on Elbows  10/4 2 min        Prone Press Up 10/4 20x        Standing Lumbar Ext 10/4 20x                  Abdominal Brace  5"x10        LFS: Alt LE  15x ea        H/L Hip ADD Isometric  5"x20        BKFO                    Neuromuscular Reeducation          TB Counter Balance                     TB Trunk Rotation                     FWD Mini Lunge          LAT Mini Lunge                    Quadruped  Alt UE          Alt LE          Opp UE/LE                    Therapeutic Activity                              Gait Training                                        Modalities

## 2022-10-17 ENCOUNTER — OFFICE VISIT (OUTPATIENT)
Dept: PHYSICAL THERAPY | Facility: CLINIC | Age: 47
End: 2022-10-17
Payer: COMMERCIAL

## 2022-10-17 DIAGNOSIS — M47.26 OSTEOARTHRITIS OF SPINE WITH RADICULOPATHY, LUMBAR REGION: Primary | ICD-10-CM

## 2022-10-17 PROCEDURE — 97110 THERAPEUTIC EXERCISES: CPT | Performed by: PHYSICAL THERAPIST

## 2022-10-17 PROCEDURE — 97140 MANUAL THERAPY 1/> REGIONS: CPT | Performed by: PHYSICAL THERAPIST

## 2022-10-17 NOTE — PROGRESS NOTES
Daily Note     Today's date: 10/17/2022  Patient name: Lacho Dominguez  : 1975  MRN: [id number]  Referring provider: QUINCY Magana  Dx:   Encounter Diagnosis     ICD-10-CM    1  Osteoarthritis of spine with radiculopathy, lumbar region  M47 26                   Subjective: Patient reports good tolerance to his LV  He had been performing his HEP  He reports he has been standing a lot more at work - had increased pain on Thursday which has persisted  He reports he has not been performing his HEP  Objective: See treatment diary below      Assessment:   Stage: chronic  Stability of Symptoms:  At Evaluation: stable - unchanged  Global Rating of Change:   Symptom Irritability Level: high  Primary Movement Diagnosis: lumbar hypomobility   Patient Specific Functional Scale:   10/4/22: return to working out at the gym - cardio, weight lifting 0/10, learn ways to manage his symptoms 0/10, return to walking over 2 miles without pain 0/10; Total 0/30  FOTO Prediction: 65 by visit 11   FOTO Progress: 56 at evaluation   Greatest Concern: find out what is going on - why am I getting all of this pain  Current Activity Plan: added to HEP (10/4); perform HEP 5x per day (10/10)  Current Educational Needs: progressions    Patient     had good tolerance to manual treatment  He had no complaints of ankle pain throughout treatment  He required cuing for form with previously issued HEP  He reported feeling much better post treatment - no pain in the leg        Skilled PT continues to be required to guide progression of lumbar mobility and strength to allow him to return to walking regularly and performing a regular gym exercise program         Plan: Continue with POC - monitor tolerance to treatment - progress as able NV     Daily Treatment Diary     DX: Lumbar radiculopathy  EPOC: 22  Follow Up with Referring Provider:   Precautions: NA  CO-MORBIDITIES: Anxiety and Depression  HEP ACCESS CODE: [id number] Treatment Diary  10/10 10/17       Manual Therapy         Lumbar Mobs PA  Gr 2/3  8 min PA  Gr 2/3  8 min                                                             Therapeutic Exercise  HEP         Treadmill  2 0 mph  5 min                  Prone on Elbows  10/4 2 min 2 min       Prone Press Up 10/4 20x        Standing Lumbar Ext 10/4 20x                  Abdominal Brace  5"x10        LFS: Alt LE  15x ea        H/L Hip ADD Isometric  5"x20        BKFO                    Neuromuscular Reeducation          TB Counter Balance                     TB Trunk Rotation                     FWD Mini Lunge          LAT Mini Lunge                    Quadruped  Alt UE          Alt LE          Opp UE/LE                    Therapeutic Activity                              Gait Training                                        Modalities

## 2022-10-17 NOTE — PROGRESS NOTES
Daily Note     Today's date: 10/17/2022  Patient name: Kristian Dove  : 1975  MRN: [id number]  Referring provider: QUINCY Cabrera  Dx:   Encounter Diagnosis     ICD-10-CM    1  Osteoarthritis of spine with radiculopathy, lumbar region  M47 26                  Subjective:  Patient reports good tolerance to his LV  He had been performing his HEP  He reports he has been standing a lot more at work - had increased pain on Thursday which has persisted  He reports he has not been performing his HEP  Objective: See treatment diary below      Assessment:   Stage: chronic  Stability of Symptoms:  At Evaluation: stable - unchanged  Global Rating of Change:   Symptom Irritability Level: high  Primary Movement Diagnosis: lumbar hypomobility   Patient Specific Functional Scale:   10/4/22: return to working out at the gym - cardio, weight lifting 0/10, learn ways to manage his symptoms 0/10, return to walking over 2 miles without pain 0/10; Total 0/30  FOTO Prediction: 65 by visit 11   FOTO Progress: 56 at evaluation   Greatest Concern: find out what is going on - why am I getting all of this pain  Current Activity Plan: added to HEP (10/4); perform HEP 5x per day (10/10)  Current Educational Needs: progressions    Patient continues to respond well to manual treatment  He continues to display with extension bias with exercises - had significant improvement in pain levels following extension based exercises today  He was encouraged to perform his HEP regularly throughout the week - even if he is having some pain  He reported feeling much better post treatment - no pain in the leg    Skilled PT continues to be required to guide progression of lumbar mobility and strength to allow him to return to walking regularly and performing a regular gym exercise program         Plan: Continue with POC - monitor tolerance to treatment - progress as able NV     Daily Treatment Diary     DX: Lumbar radiculopathy  EPOC: 11/16/22  Follow Up with Referring Provider:   Precautions: NA  CO-MORBIDITIES: Anxiety and Depression  HEP ACCESS CODE: [id number]       Treatment Diary  10/10 10/17       Manual Therapy         Lumbar Mobs PA  Gr 2/3  8 min PA   Gr 2/3  8 min                                                             Therapeutic Exercise  HEP         Treadmill  2 0 mph  5 min 2 0 mph  5 min                 Prone on Elbows  10/4 2 min 2 min       Prone Press Up 10/4 20x 5x10       Standing Lumbar Ext 10/4 20x 20x                 Abdominal Brace  5"x10 5"x10       LFS: Alt LE  15x ea 20x ea       H/L Hip ADD Isometric  5"x20 5"x20       BKFO                    Neuromuscular Reeducation          TB Counter Balance                     TB Trunk Rotation                     FWD Mini Lunge          LAT Mini Lunge                    Quadruped  Alt UE   20x ea       Alt LE          Opp UE/LE                    Therapeutic Activity                              Gait Training                                        Modalities

## 2022-10-21 ENCOUNTER — HOSPITAL ENCOUNTER (OUTPATIENT)
Dept: RADIOLOGY | Facility: HOSPITAL | Age: 47
Discharge: HOME | End: 2022-10-21
Payer: COMMERCIAL

## 2022-10-21 DIAGNOSIS — M25.572 ACUTE LEFT ANKLE PAIN: ICD-10-CM

## 2022-10-21 DIAGNOSIS — R52 PAIN: ICD-10-CM

## 2022-10-21 PROCEDURE — 73610 X-RAY EXAM OF ANKLE: CPT

## 2022-10-24 ENCOUNTER — LAB (OUTPATIENT)
Dept: LAB | Facility: HOSPITAL | Age: 47
End: 2022-10-24
Payer: COMMERCIAL

## 2022-10-24 DIAGNOSIS — Z11.4 ENCOUNTER FOR SCREENING FOR HIV: ICD-10-CM

## 2022-10-24 DIAGNOSIS — Z11.59 NEED FOR HEPATITIS C SCREENING TEST: ICD-10-CM

## 2022-10-24 DIAGNOSIS — Z13.29 THYROID DISORDER SCREENING: ICD-10-CM

## 2022-10-24 DIAGNOSIS — Z13.220 SCREENING FOR LIPID DISORDERS: ICD-10-CM

## 2022-10-24 DIAGNOSIS — F41.1 GENERALIZED ANXIETY DISORDER: ICD-10-CM

## 2022-10-24 DIAGNOSIS — Z13.0 SCREENING FOR DEFICIENCY ANEMIA: ICD-10-CM

## 2022-10-24 DIAGNOSIS — M47.26 OSTEOARTHRITIS OF SPINE WITH RADICULOPATHY, LUMBAR REGION: ICD-10-CM

## 2022-10-24 LAB
ALBUMIN SERPL BCP-MCNC: 3.9 G/DL (ref 3.5–5)
ALP SERPL-CCNC: 60 U/L (ref 46–116)
ALT SERPL W P-5'-P-CCNC: 88 U/L (ref 12–78)
ANION GAP SERPL CALCULATED.3IONS-SCNC: 2 MMOL/L (ref 4–13)
AST SERPL W P-5'-P-CCNC: 40 U/L (ref 5–45)
BASOPHILS # BLD AUTO: 0.07 THOUSANDS/ÂΜL (ref 0–0.1)
BASOPHILS NFR BLD AUTO: 1 % (ref 0–1)
BILIRUB SERPL-MCNC: 0.61 MG/DL (ref 0.2–1)
BUN SERPL-MCNC: 13 MG/DL (ref 5–25)
CALCIUM SERPL-MCNC: 9.4 MG/DL (ref 8.3–10.1)
CHLORIDE SERPL-SCNC: 107 MMOL/L (ref 96–108)
CHOLEST SERPL-MCNC: 150 MG/DL
CO2 SERPL-SCNC: 28 MMOL/L (ref 21–32)
CREAT SERPL-MCNC: 1.14 MG/DL (ref 0.6–1.3)
EOSINOPHIL # BLD AUTO: 0.38 THOUSAND/ÂΜL (ref 0–0.61)
EOSINOPHIL NFR BLD AUTO: 5 % (ref 0–6)
ERYTHROCYTE [DISTWIDTH] IN BLOOD BY AUTOMATED COUNT: 12.5 % (ref 11.6–15.1)
GFR SERPL CREATININE-BSD FRML MDRD: 76 ML/MIN/1.73SQ M
GLUCOSE P FAST SERPL-MCNC: 92 MG/DL (ref 65–99)
HCT VFR BLD AUTO: 48.9 % (ref 36.5–49.3)
HDLC SERPL-MCNC: 42 MG/DL
HGB BLD-MCNC: 16 G/DL (ref 12–17)
IMM GRANULOCYTES # BLD AUTO: 0.01 THOUSAND/UL (ref 0–0.2)
IMM GRANULOCYTES NFR BLD AUTO: 0 % (ref 0–2)
LDLC SERPL CALC-MCNC: 93 MG/DL (ref 0–100)
LYMPHOCYTES # BLD AUTO: 2.74 THOUSANDS/ÂΜL (ref 0.6–4.47)
LYMPHOCYTES NFR BLD AUTO: 33 % (ref 14–44)
MCH RBC QN AUTO: 28 PG (ref 26.8–34.3)
MCHC RBC AUTO-ENTMCNC: 32.7 G/DL (ref 31.4–37.4)
MCV RBC AUTO: 86 FL (ref 82–98)
MONOCYTES # BLD AUTO: 0.65 THOUSAND/ÂΜL (ref 0.17–1.22)
MONOCYTES NFR BLD AUTO: 8 % (ref 4–12)
NEUTROPHILS # BLD AUTO: 4.56 THOUSANDS/ÂΜL (ref 1.85–7.62)
NEUTS SEG NFR BLD AUTO: 53 % (ref 43–75)
NONHDLC SERPL-MCNC: 108 MG/DL
NRBC BLD AUTO-RTO: 0 /100 WBCS
PLATELET # BLD AUTO: 312 THOUSANDS/UL (ref 149–390)
PMV BLD AUTO: 11.1 FL (ref 8.9–12.7)
POTASSIUM SERPL-SCNC: 4.7 MMOL/L (ref 3.5–5.3)
PROT SERPL-MCNC: 7.6 G/DL (ref 6.4–8.4)
RBC # BLD AUTO: 5.71 MILLION/UL (ref 3.88–5.62)
SODIUM SERPL-SCNC: 137 MMOL/L (ref 135–147)
TRIGL SERPL-MCNC: 73 MG/DL
TSH SERPL DL<=0.05 MIU/L-ACNC: 2.09 UIU/ML (ref 0.45–4.5)
WBC # BLD AUTO: 8.41 THOUSAND/UL (ref 4.31–10.16)

## 2022-10-24 PROCEDURE — 84443 ASSAY THYROID STIM HORMONE: CPT

## 2022-10-24 PROCEDURE — 87389 HIV-1 AG W/HIV-1&-2 AB AG IA: CPT

## 2022-10-24 PROCEDURE — 80053 COMPREHEN METABOLIC PANEL: CPT

## 2022-10-24 PROCEDURE — 85025 COMPLETE CBC W/AUTO DIFF WBC: CPT

## 2022-10-24 PROCEDURE — 80061 LIPID PANEL: CPT

## 2022-10-24 PROCEDURE — 86803 HEPATITIS C AB TEST: CPT

## 2022-10-24 PROCEDURE — 36415 COLL VENOUS BLD VENIPUNCTURE: CPT

## 2022-10-25 ENCOUNTER — APPOINTMENT (OUTPATIENT)
Dept: PHYSICAL THERAPY | Facility: CLINIC | Age: 47
End: 2022-10-25

## 2022-10-25 DIAGNOSIS — R76.8 HEPATITIS C ANTIBODY POSITIVE IN BLOOD: Primary | ICD-10-CM

## 2022-10-25 LAB
HCV AB SER QL: ABNORMAL
HIV 1+2 AB+HIV1 P24 AG SERPL QL IA: NORMAL

## 2022-10-31 ENCOUNTER — OFFICE VISIT (OUTPATIENT)
Dept: FAMILY MEDICINE CLINIC | Facility: HOSPITAL | Age: 47
End: 2022-10-31

## 2022-10-31 ENCOUNTER — OFFICE VISIT (OUTPATIENT)
Dept: PHYSICAL THERAPY | Facility: CLINIC | Age: 47
End: 2022-10-31

## 2022-10-31 VITALS
HEART RATE: 72 BPM | WEIGHT: 222 LBS | HEIGHT: 69 IN | DIASTOLIC BLOOD PRESSURE: 78 MMHG | SYSTOLIC BLOOD PRESSURE: 102 MMHG | OXYGEN SATURATION: 98 % | BODY MASS INDEX: 32.88 KG/M2

## 2022-10-31 DIAGNOSIS — M25.572 BILATERAL ANKLE JOINT PAIN: ICD-10-CM

## 2022-10-31 DIAGNOSIS — M25.571 BILATERAL ANKLE JOINT PAIN: ICD-10-CM

## 2022-10-31 DIAGNOSIS — M51.36 BULGING LUMBAR DISC: ICD-10-CM

## 2022-10-31 DIAGNOSIS — M47.26 OSTEOARTHRITIS OF SPINE WITH RADICULOPATHY, LUMBAR REGION: ICD-10-CM

## 2022-10-31 DIAGNOSIS — M47.26 OSTEOARTHRITIS OF SPINE WITH RADICULOPATHY, LUMBAR REGION: Primary | ICD-10-CM

## 2022-10-31 DIAGNOSIS — F41.1 GENERALIZED ANXIETY DISORDER: ICD-10-CM

## 2022-10-31 DIAGNOSIS — Z00.00 WELL ADULT EXAM: Primary | ICD-10-CM

## 2022-10-31 DIAGNOSIS — N52.9 ERECTILE DYSFUNCTION, UNSPECIFIED ERECTILE DYSFUNCTION TYPE: ICD-10-CM

## 2022-10-31 DIAGNOSIS — M79.18 MYOFASCIAL PAIN SYNDROME: ICD-10-CM

## 2022-10-31 DIAGNOSIS — Z12.11 SCREENING FOR COLON CANCER: ICD-10-CM

## 2022-10-31 DIAGNOSIS — Z20.5 EXPOSURE TO HEPATITIS C: ICD-10-CM

## 2022-10-31 DIAGNOSIS — Z12.5 SCREENING FOR PROSTATE CANCER: ICD-10-CM

## 2022-10-31 RX ORDER — TADALAFIL 10 MG/1
10 TABLET ORAL DAILY PRN
Qty: 10 TABLET | Refills: 0 | Status: SHIPPED | OUTPATIENT
Start: 2022-10-31

## 2022-10-31 RX ORDER — HYDROXYZINE 50 MG/1
50 TABLET, FILM COATED ORAL 3 TIMES DAILY PRN
Qty: 30 TABLET | Refills: 5 | Status: SHIPPED | OUTPATIENT
Start: 2022-10-31

## 2022-10-31 RX ORDER — DULOXETIN HYDROCHLORIDE 30 MG/1
30 CAPSULE, DELAYED RELEASE ORAL DAILY
Qty: 30 CAPSULE | Refills: 1 | Status: SHIPPED | OUTPATIENT
Start: 2022-10-31

## 2022-10-31 NOTE — PROGRESS NOTES
Mariel Barnes is a 56 y o   male and is here for routine health maintenance  History of Present Illness       Lorrie Ramos returns to the office for an annual physical   His Hep C antibody was highly reactive, he is agreeable to follow up testing  He has been going to PT for his back, but will be on house arrest as of Friday for 60 days and will have to stop PT temporarily  He has been given home exercises that he can do while at home  Chiqui Pink took  duloxetine for 5 days but felt it caused erectile dysfunction so he stopped taking it  His anxiety is heightened at our visit today  He has been taking the hydroxyzine 50mg daily and Mobic  He had repeat Xray of his ankles due to persistent arthritic pain with radicular symptoms which showed mild arthritis  He is requesting an ortho consult for further evaluation  Well Adult Physical   Patient here for a comprehensive physical exam       Diet and Physical Activity  Diet: well balanced diet  Weight concerns: Patient has class 1 obesity (BMI 30-34  9)  Exercise: daily      Depression Screen  PHQ-2/9 Depression Screening            General Health  Hearing: Normal:  bilateral  Vision: goes for regular eye exams, most recent eye exam <1 year and wears glasses  Dental: regular dental visits and brushes teeth twice daily      Cancer Screening  Colononoscopy will order cologuard  PSA ordered    Smoker continues to smoke 10 cigarettes daily  Annual screening with low-dose helical computed tomography (CT) for patients age 54 to 76 years with history of smoking at least 30 pack-years and, if a former smoker, had quit within the previous 15 years      The following portions of the patient's history were reviewed and updated as appropriate: allergies, current medications, past family history, past medical history, past social history, past surgical history and problem list     Review of Systems     Review of Systems   Constitutional: Negative  Negative for activity change, appetite change, chills, fatigue and fever  HENT: Negative  Negative for congestion, ear pain, postnasal drip and sinus pain  Eyes: Negative  Respiratory: Negative  Negative for cough and shortness of breath  Cardiovascular: Negative  Negative for chest pain and leg swelling  Gastrointestinal: Negative  Negative for constipation and diarrhea  Endocrine: Negative  Genitourinary: Negative  Negative for dysuria  Musculoskeletal: Positive for arthralgias  Skin: Negative  Allergic/Immunologic: Negative  Negative for immunocompromised state  Neurological: Negative  Negative for dizziness and light-headedness  Hematological: Negative  Psychiatric/Behavioral: Negative  Past Medical History     Past Medical History:   Diagnosis Date   • Anxiety    • Depression        Past Surgical History     History reviewed  No pertinent surgical history  Social History     Social History     Socioeconomic History   • Marital status: Single     Spouse name: None   • Number of children: None   • Years of education: None   • Highest education level: None   Occupational History   • None   Tobacco Use   • Smoking status: Current Every Day Smoker     Packs/day: 0 50     Types: Cigarettes   • Smokeless tobacco: Never Used   Vaping Use   • Vaping Use: Never used   Substance and Sexual Activity   • Alcohol use: No   • Drug use: No   • Sexual activity: None   Other Topics Concern   • None   Social History Narrative    Lives with mom  Feels safe at home  Sees dentist reg  No living will        Social Determinants of Health     Financial Resource Strain: Not on file   Food Insecurity: Not on file   Transportation Needs: Not on file   Physical Activity: Not on file   Stress: Not on file   Social Connections: Not on file   Intimate Partner Violence: Not on file   Housing Stability: Not on file       Family History     Family History   Problem Relation Age of Onset • Diabetes Mother    • Stroke Mother    • Hypertension Mother    • Rheum arthritis Mother    • Heart disease Father         cardiac disorder   • Cancer Father    • Diabetes Brother    • Other Family         CVA-due to other mechanism   • Arthritis Family    • Heart disease Family         cardiac disorder   • Diabetes Family    • Hypertension Family    • Thyroid disease Family    • Neuropathy Family         Generalized   • Substance Abuse Neg Hx    • Mental illness Neg Hx        Current Medications       Current Outpatient Medications:   •  DULoxetine (Cymbalta) 30 mg delayed release capsule, Take 1 capsule (30 mg total) by mouth daily, Disp: 30 capsule, Rfl: 1  •  hydrOXYzine HCL (ATARAX) 50 mg tablet, Take 1 tablet (50 mg total) by mouth 3 (three) times a day as needed for anxiety, Disp: 30 tablet, Rfl: 0  •  meloxicam (MOBIC) 15 mg tablet, Take 1 tablet (15 mg total) by mouth daily, Disp: 30 tablet, Rfl: 5     Allergies     No Known Allergies    Objective     There were no vitals taken for this visit  Physical Exam  Vitals and nursing note reviewed  Constitutional:       Appearance: Normal appearance  HENT:      Head: Normocephalic and atraumatic  Right Ear: Tympanic membrane, ear canal and external ear normal       Left Ear: Tympanic membrane, ear canal and external ear normal       Nose: Nose normal       Mouth/Throat:      Mouth: Mucous membranes are moist       Pharynx: Oropharynx is clear  Eyes:      Extraocular Movements: Extraocular movements intact  Conjunctiva/sclera: Conjunctivae normal       Pupils: Pupils are equal, round, and reactive to light  Cardiovascular:      Rate and Rhythm: Normal rate and regular rhythm  Pulses: Normal pulses  Heart sounds: Normal heart sounds  Pulmonary:      Effort: Pulmonary effort is normal       Breath sounds: Normal breath sounds  Abdominal:      General: Bowel sounds are normal       Palpations: Abdomen is soft     Musculoskeletal: General: Tenderness present  Normal range of motion  Cervical back: Normal range of motion and neck supple  Right lower leg: No edema  Left lower leg: No edema  Comments: Lumbar paraspinal tenderness   Skin:     General: Skin is warm and dry  Neurological:      General: No focal deficit present  Mental Status: He is alert and oriented to person, place, and time  Gait: Gait abnormal       Comments: Antalgic gait   Psychiatric:         Mood and Affect: Mood is anxious  Speech: Speech is rapid and pressured  Behavior: Behavior normal          Thought Content:  Thought content normal          Judgment: Judgment normal            No exam data present    Health Maintenance     Health Maintenance   Topic Date Due   • Pneumococcal Vaccine: Pediatrics (0 to 5 Years) and At-Risk Patients (6 to 59 Years) (1 - PCV) Never done   • BMI: Followup Plan  Never done   • DTaP,Tdap,and Td Vaccines (1 - Tdap) Never done   • Colorectal Cancer Screening  Never done   • COVID-19 Vaccine (3 - Booster for Pfizer series) 10/11/2021   • Influenza Vaccine (1) Never done   • PT PLAN OF CARE  11/03/2022   • Depression Screening  09/29/2023   • BMI: Adult  09/29/2023   • Annual Physical  10/31/2023   • HIV Screening  Completed   • Hepatitis C Screening  Completed   • HIB Vaccine  Aged Out   • Hepatitis B Vaccine  Aged Out   • IPV Vaccine  Aged Out   • Hepatitis A Vaccine  Aged Out   • Meningococcal ACWY Vaccine  Aged Out   • HPV Vaccine  Aged Out     Immunization History   Administered Date(s) Administered   • COVID-19 PFIZER VACCINE 0 3 ML IM 04/16/2021, 05/11/2021       Laboratory Results:   Lab Results   Component Value Date    WBC 8 41 10/24/2022    HGB 16 0 10/24/2022    HCT 48 9 10/24/2022    MCV 86 10/24/2022     10/24/2022     Lab Results   Component Value Date    BUN 13 10/24/2022     Lab Results   Component Value Date    ALT 88 (H) 10/24/2022    AST 40 10/24/2022     No results found for: TSH  No results found for: HGBA1C    Lipid Profile:   No results found for: CHOL  Lab Results   Component Value Date    HDL 42 10/24/2022     No results found for: [facility]  Lab Results   Component Value Date    LDLCALC 93 10/24/2022     Lab Results   Component Value Date    TRIG 73 10/24/2022       Assessment/Plan       1  Healthy male exam   2  Patient Counseling:   · Nutrition: Stressed importance of a well balanced diet, moderation of sodium/saturated fat, caloric balance and sufficient intake of fiber  · Exercise: Stressed the importance of regular exercise with a goal of 150 minutes per week  · Dental Health: Discussed daily flossing and brushing and regular dental visits   · Sexuality: Discussed sexually transmitted infections, use of condoms and prevention of unintended pregnancy  · Alcohol Use:  Recommended moderation of alcohol intake  · Injury Prevention: Discussed Safety Belts, Safety Helmets, and Smoke Detectors    · Immunizations reviewed  · Discussed benefits of screening  · Discussed the patient's BMI with him  The BMI is above average; BMI management plan is completed  3  Cancer Screening   4  Labs follow up ordered  5  Follow up in 2 month      Simone Gandara

## 2022-10-31 NOTE — PROGRESS NOTES
Daily Note     Today's date: 10/31/2022  Patient name: Too Victoria  : 1975  MRN: [id number]  Referring provider: QUINCY Gregory  Dx:   Encounter Diagnosis     ICD-10-CM    1  Osteoarthritis of spine with radiculopathy, lumbar region  M47 26                  Subjective:  Patient reports he continues to have intermittent symptoms  He notes his pain is worse when he wakes up  He reports he has been very busy with work and family activities  He has been performing his HEP 1-2x per day  Objective: See treatment diary below      Assessment:   Stage: chronic  Stability of Symptoms:  At Evaluation: stable - unchanged  Global Rating of Change:   Symptom Irritability Level: high  Primary Movement Diagnosis: lumbar hypomobility   Patient Specific Functional Scale:   10/4/22: return to working out at the gym - cardio, weight lifting 0/10, learn ways to manage his symptoms 0/10, return to walking over 2 miles without pain 0/10; Total 0/30  FOTO Prediction: 65 by visit 11   FOTO Progress: 56 at evaluation   Greatest Concern: find out what is going on - why am I getting all of this pain  Current Activity Plan: added to HEP (10/4); perform HEP 5x per day (10/10); perform HEP 5x per day (10/31)  Current Educational Needs: progressions    Patient had good tolerance to manual treatment - lower back pain resolved and patient had less stiffness in leg  He demonstrated good form with prone press ups and standing lumbar extension - significant improvement in flexibility following  Importance of performing HEP at least 5x per day was reiterated  He had no complaints of increased pain post treatment    Skilled PT continues to be required to guide progression of lumbar mobility and strength to allow him to return to walking regularly and performing a regular gym exercise program         Plan: Continue with POC - monitor tolerance to treatment - progress as able NV     Daily Treatment Diary     DX: Lumbar radiculopathy  EPOC: 11/16/22  Follow Up with Referring Provider:   Precautions: NA  CO-MORBIDITIES: Anxiety and Depression  HEP ACCESS CODE: [id number]       Treatment Diary  10/10 10/17 10/31      Manual Therapy         Lumbar Mobs PA  Gr 2/3  8 min PA   Gr 2/3  8 min PA  Gr 2/3  8 min                                                            Therapeutic Exercise  HEP         Treadmill  2 0 mph  5 min 2 0 mph  5 min 2 0 mph  6 min                Prone on Elbows  10/4 2 min 2 min 2 min      Prone Press Up 10/4 20x 5x10 20x      Standing Lumbar Ext 10/4 20x 20x 20x                Abdominal Brace  5"x10 5"x10 5"x10      LFS: Alt LE  15x ea 20x ea 20x ea      H/L Hip ADD Isometric  5"x20 5"x20 5"x20      BKFO                    Neuromuscular Reeducation          TB Counter Balance                     TB Trunk Rotation                     FWD Mini Lunge          LAT Mini Lunge                    Quadruped  Alt UE   20x ea 20x ea      Alt LE    10x ea      Opp UE/LE                    Therapeutic Activity                              Gait Training                                        Modalities

## 2022-12-02 ENCOUNTER — OFFICE VISIT (OUTPATIENT)
Dept: OBGYN CLINIC | Facility: CLINIC | Age: 47
End: 2022-12-02

## 2022-12-02 VITALS — HEIGHT: 69 IN | WEIGHT: 222 LBS | BODY MASS INDEX: 32.88 KG/M2

## 2022-12-02 DIAGNOSIS — M79.18 MYOFASCIAL PAIN SYNDROME: ICD-10-CM

## 2022-12-02 DIAGNOSIS — M25.372 LEFT ANKLE INSTABILITY: Primary | ICD-10-CM

## 2022-12-02 DIAGNOSIS — M25.572 BILATERAL ANKLE JOINT PAIN: ICD-10-CM

## 2022-12-02 DIAGNOSIS — M25.571 BILATERAL ANKLE JOINT PAIN: ICD-10-CM

## 2022-12-02 DIAGNOSIS — M47.26 OSTEOARTHRITIS OF SPINE WITH RADICULOPATHY, LUMBAR REGION: ICD-10-CM

## 2022-12-02 NOTE — PROGRESS NOTES
ISRAEL Steele  Attending, Orthopaedic Surgery  [facility] [address] [facility]      ORTHOPAEDIC FOOT AND ANKLE CLINIC VISIT     Assessment:     Encounter Diagnoses   Name Primary? • Osteoarthritis of spine with radiculopathy, lumbar region    • Myofascial pain syndrome    • Bilateral ankle joint pain    • Left ankle instability Yes            Plan:   · The patient verbalized understanding of exam findings and treatment plan  We engaged in the shared decision-making process and treatment options were discussed at length with the patient  Surgical and conservative management discussed today along with risks and benefits  · The patient has significant left ankle weakness compared to the right  Consequently, he is overloading his lateral ligaments and has pain in this area  · He has a prominent peroneal tubercle on Xray but his Xrays are otherwise unremarkable  · He is to start PT immediately to treat his under-rehabilitated ankle  Order placed  · If he does not see improvement with PT, he may require an MRI  Return if symptoms worsen or fail to improve  History of Present Illness:   Chief Complaint:   Chief Complaint   Patient presents with   • Left Ankle - Pain     Noemi Brooks is a 56 y o  male who is being seen for left lateral ankle pain  Patient states it has been progressively worsening for about 2 years  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  Pain is localized at lateral ankle with minimal radiating and described as sharp and severe  Patient denies numbness, tingling or radicular pain  Denies history of neuropathy  Patient does not smoke, does not  have diabetes and does not take blood thinners  Patient denies family history of anesthesia complications and has not had any complications with anesthesia       Pain/symptom timing:  Worse during the day when active  Pain/symptom context:  Worse with activites and work  Pain/symptom modifying factors: Rest makes better, activities make worse  Pain/symptom associated signs/symptoms: none    Prior treatment   · NSAIDsYes   · Injections No   · Bracing/Orthotics No    · Physical Therapy No     Orthopedic Surgical History:   See below    Past Medical, Surgical and Social History:  Past Medical History:  has a past medical history of Anxiety and Depression  Problem List: does not have any pertinent problems on file  Past Surgical History:  has no past surgical history on file  Family History: family history includes Arthritis in his family; Cancer in his father; Diabetes in his brother, family, and mother; Heart disease in his family and father; Hypertension in his family and mother; Neuropathy in his family; Other in his family; Rheum arthritis in his mother; Stroke in his mother; Thyroid disease in his family  Social History:  reports that he has been smoking cigarettes  He has been smoking an average of  5 packs per day  He has never used smokeless tobacco  He reports that he does not drink alcohol and does not use drugs  Current Medications: has a current medication list which includes the following prescription(s): duloxetine, hydroxyzine hcl, meloxicam, and tadalafil  Allergies: has No Known Allergies  Review of Systems:  General- denies fever/chills  HEENT- denies hearing loss or sore throat  Eyes- denies eye pain or visual disturbances, denies red eyes  Respiratory- denies cough or SOB  Cardio- denies chest pain or palpitations  GI- denies abdominal pain  Endocrine- denies urinary frequency  Urinary- denies pain with urination  Musculoskeletal- Negative except noted above  Skin- denies rashes or wounds  Neurological- denies dizziness or headache  Psychiatric- denies anxiety or difficulty concentrating    Physical Exam:   Ht 5' 9" (1 753 m)   Wt 101 kg (222 lb)   BMI 32 78 kg/m²   General/Constitutional: No apparent distress: well-nourished and well developed    Eyes: normal ocular motion  Cardio: RRR, Normal S1S2, No m/r/g  Lymphatic: No appreciable lymphadenopathy  Respiratory: Non-labored breathing, CTA b/l no w/c/r  Vascular: No edema, swelling or tenderness, except as noted in detailed exam   Integumentary: No impressive skin lesions present, except as noted in detailed exam   Neuro: No ataxia or tremors noted  Psych: Normal mood and affect, oriented to person, place and time  Appropriate affect  Musculoskeletal: Normal, except as noted in detailed exam and in HPI  Examination    Left    Gait Normal   Musculoskeletal Tender to palpation at lateral ankle    Skin Normal       Nails Normal    Range of Motion  5 degrees dorsiflexion, 15 degrees plantarflexion  Subtalar motion: normal    Stability Stable    Muscle Strength 5/5 tibialis anterior  4/5 gastrocnemius-soleus  4/5 posterior tibialis  4/5 peroneal/eversion strength  5/5 EHL  5/5 FHL    Neurologic Normal    Sensation Intact to light touch throughout sural, saphenous, superficial peroneal, deep peroneal and medial/lateral plantar nerve distributions  [place]-Krissy 5 07 filament (10g) testing  deferred  Cardiovascular Brisk capillary refill < 2 seconds,intact DP and PT pulses    Special Tests None      Imaging Studies:   3 views of the left foot were taken, reviewed and interpreted independently that demonstrate prominent peroneal tubercle and no other osseous abnormalities  Reviewed by me personally  Scribe Attestation    I,:  Hafsa Pena PA-C am acting as a scribe while in the presence of the attending physician :       I,:  Delmar Soto MD personally performed the services described in this documentation    as scribed in my presence :             Margene Maroon Lachman, MD  Foot & Ankle Surgery   Department of [address] [place] [address]      I personally performed the service  Margene Maroon Lachman, MD

## 2023-01-03 ENCOUNTER — APPOINTMENT (OUTPATIENT)
Dept: LAB | Facility: HOSPITAL | Age: 48
End: 2023-01-03

## 2023-01-03 DIAGNOSIS — Z12.5 SCREENING FOR PROSTATE CANCER: ICD-10-CM

## 2023-01-03 DIAGNOSIS — R76.8 HEPATITIS C ANTIBODY POSITIVE IN BLOOD: ICD-10-CM

## 2023-01-04 LAB
PSA FREE MFR SERPL: 23.1 %
PSA FREE SERPL-MCNC: 0.37 NG/ML
PSA SERPL-MCNC: 1.6 NG/ML (ref 0–4)

## 2023-01-05 ENCOUNTER — OFFICE VISIT (OUTPATIENT)
Dept: FAMILY MEDICINE CLINIC | Facility: HOSPITAL | Age: 48
End: 2023-01-05

## 2023-01-05 VITALS
BODY MASS INDEX: 33.77 KG/M2 | SYSTOLIC BLOOD PRESSURE: 100 MMHG | OXYGEN SATURATION: 98 % | WEIGHT: 228 LBS | HEIGHT: 69 IN | HEART RATE: 65 BPM | DIASTOLIC BLOOD PRESSURE: 64 MMHG

## 2023-01-05 DIAGNOSIS — M47.26 OSTEOARTHRITIS OF SPINE WITH RADICULOPATHY, LUMBAR REGION: ICD-10-CM

## 2023-01-05 DIAGNOSIS — Z20.5 EXPOSURE TO HEPATITIS C: Primary | ICD-10-CM

## 2023-01-05 DIAGNOSIS — M25.572 BILATERAL ANKLE JOINT PAIN: ICD-10-CM

## 2023-01-05 DIAGNOSIS — F41.1 GENERALIZED ANXIETY DISORDER: ICD-10-CM

## 2023-01-05 DIAGNOSIS — F17.200 TOBACCO USE DISORDER: ICD-10-CM

## 2023-01-05 DIAGNOSIS — M51.36 BULGING LUMBAR DISC: ICD-10-CM

## 2023-01-05 DIAGNOSIS — M79.18 MYOFASCIAL PAIN SYNDROME: ICD-10-CM

## 2023-01-05 DIAGNOSIS — M25.571 BILATERAL ANKLE JOINT PAIN: ICD-10-CM

## 2023-01-05 LAB
HCV RNA SERPL NAA+PROBE-ACNC: NORMAL IU/ML
HCV RNA SERPL NAA+PROBE-LOG IU: 4.97 LOG10 IU/ML
TEST INFORMATION: NORMAL

## 2023-01-05 RX ORDER — DULOXETIN HYDROCHLORIDE 30 MG/1
30 CAPSULE, DELAYED RELEASE ORAL DAILY
Qty: 90 CAPSULE | Refills: 3 | Status: SHIPPED | OUTPATIENT
Start: 2023-01-05

## 2023-01-05 RX ORDER — HYDROXYZINE 50 MG/1
50 TABLET, FILM COATED ORAL 3 TIMES DAILY PRN
Qty: 90 TABLET | Refills: 3 | Status: SHIPPED | OUTPATIENT
Start: 2023-01-05

## 2023-01-05 RX ORDER — MELOXICAM 15 MG/1
15 TABLET ORAL DAILY
Qty: 90 TABLET | Refills: 3 | Status: SHIPPED | OUTPATIENT
Start: 2023-01-05

## 2023-01-05 NOTE — PROGRESS NOTES
[address] [facility]    Assessment/Plan:      Diagnosis ICD-10-CM Associated Orders   1  Exposure to hepatitis C  Z20 5 Ambulatory Referral to Hepatology      2  Bulging lumbar disc  M51 36 meloxicam (MOBIC) 15 mg tablet      3  Osteoarthritis of spine with radiculopathy, lumbar region  M47 26 meloxicam (MOBIC) 15 mg tablet     DULoxetine (Cymbalta) 30 mg delayed release capsule      4  Generalized anxiety disorder  F41 1 hydrOXYzine HCL (ATARAX) 50 mg tablet     DULoxetine (Cymbalta) 30 mg delayed release capsule      5  Myofascial pain syndrome  M79 18 DULoxetine (Cymbalta) 30 mg delayed release capsule      6  Bilateral ankle joint pain  M25 571     M25 572       7  Tobacco use disorder  F17 200         • Referral to Hepatology  • Complete cologuard  Return in about 10 months (around 11/5/2023) for Annual physical   • Patient may call or return to office with any questions or concerns  ______________________________________________________________________  Subjective:     Patient ID: Alfonso Read is a 56 y o  male  Spero Nigela is here for follow up  His anxiety is much better today  He is working nightshift, reports sleep pattern improved from last visit  He is off house arrest and will be getting his cologuard completed as ordered  Discussed PSA and Hep C labwork, will order referral to hepatology for evaluation/further guidance  Assessed by Ortho on 12/2/22 for left ankle weakness  He was ordered PT which he will start this month  Continues to work out, smokes 1ppd, starting to consider cessation  Will reinforce this          Alfonso Read  Chief Complaint   Patient presents with   • Follow-up                The following portions of the patient's history were reviewed and updated as appropriate: allergies, current medications, past family history, past medical history, past social history, past surgical history and problem list     Review of Systems Constitutional: Negative  Negative for activity change, appetite change, chills, fatigue and fever  HENT: Negative  Negative for congestion, ear pain, postnasal drip and sinus pain  Eyes: Negative  Respiratory: Negative  Negative for cough and shortness of breath  Cardiovascular: Negative  Negative for chest pain and leg swelling  Gastrointestinal: Negative  Negative for constipation and diarrhea  Endocrine: Negative  Genitourinary: Negative  Negative for dysuria  Musculoskeletal: Positive for arthralgias  Skin: Negative  Allergic/Immunologic: Negative  Negative for immunocompromised state  Neurological: Negative  Negative for dizziness and light-headedness  Hematological: Negative  Psychiatric/Behavioral: Negative  Objective:      Vitals:    01/05/23 1409   BP: 100/64   Pulse: 65   SpO2: 98%      Physical Exam  Vitals and nursing note reviewed  Constitutional:       Appearance: Normal appearance  HENT:      Head: Normocephalic and atraumatic  Right Ear: Tympanic membrane, ear canal and external ear normal       Left Ear: Tympanic membrane, ear canal and external ear normal       Nose: Nose normal       Mouth/Throat:      Mouth: Mucous membranes are moist       Pharynx: Oropharynx is clear  Eyes:      Extraocular Movements: Extraocular movements intact  Conjunctiva/sclera: Conjunctivae normal       Pupils: Pupils are equal, round, and reactive to light  Cardiovascular:      Rate and Rhythm: Normal rate and regular rhythm  Pulses: Normal pulses  Heart sounds: Normal heart sounds  Pulmonary:      Effort: Pulmonary effort is normal       Breath sounds: Normal breath sounds  Abdominal:      General: Bowel sounds are normal       Palpations: Abdomen is soft  Musculoskeletal:         General: Normal range of motion  Cervical back: Normal range of motion and neck supple  Skin:     General: Skin is warm and dry     Neurological: General: No focal deficit present  Mental Status: He is alert and oriented to person, place, and time  Psychiatric:         Mood and Affect: Mood normal          Behavior: Behavior normal          Thought Content: Thought content normal          Judgment: Judgment normal       Comments: Anxiety well controlled at today's visit           Portions of the record may have been created with voice recognition software  Occasional wrong word or "sound alike" substitutions may have occurred due to the inherent limitations of voice recognition software  Please review the chart carefully and recognize, using context, where substitutions/typographical errors may have occurred

## 2023-02-18 ENCOUNTER — APPOINTMENT (EMERGENCY)
Dept: CT IMAGING | Facility: HOSPITAL | Age: 48
End: 2023-02-18

## 2023-02-18 ENCOUNTER — HOSPITAL ENCOUNTER (EMERGENCY)
Facility: HOSPITAL | Age: 48
Discharge: HOME | End: 2023-02-18
Attending: EMERGENCY MEDICINE

## 2023-02-18 VITALS
DIASTOLIC BLOOD PRESSURE: 74 MMHG | HEART RATE: 79 BPM | SYSTOLIC BLOOD PRESSURE: 125 MMHG | RESPIRATION RATE: 18 BRPM | OXYGEN SATURATION: 98 % | TEMPERATURE: 99.9 F

## 2023-02-18 DIAGNOSIS — L03.90 CELLULITIS: Primary | ICD-10-CM

## 2023-02-18 LAB
ALBUMIN SERPL BCP-MCNC: 4.2 G/DL (ref 3.5–5)
ALP SERPL-CCNC: 55 U/L (ref 34–104)
ALT SERPL W P-5'-P-CCNC: 38 U/L (ref 7–52)
ANION GAP SERPL CALCULATED.3IONS-SCNC: 8 MMOL/L (ref 4–13)
AST SERPL W P-5'-P-CCNC: 23 U/L (ref 13–39)
BASOPHILS # BLD AUTO: 0.04 THOUSANDS/ÂΜL (ref 0–0.1)
BASOPHILS NFR BLD AUTO: 0 % (ref 0–1)
BILIRUB SERPL-MCNC: 0.77 MG/DL (ref 0.2–1)
BUN SERPL-MCNC: 14 MG/DL (ref 5–25)
CALCIUM SERPL-MCNC: 9.1 MG/DL (ref 8.4–10.2)
CHLORIDE SERPL-SCNC: 103 MMOL/L (ref 96–108)
CO2 SERPL-SCNC: 26 MMOL/L (ref 21–32)
CREAT SERPL-MCNC: 1.22 MG/DL (ref 0.6–1.3)
D DIMER PPP FEU-MCNC: 0.51 UG/ML FEU
EOSINOPHIL # BLD AUTO: 0.26 THOUSAND/ÂΜL (ref 0–0.61)
EOSINOPHIL NFR BLD AUTO: 2 % (ref 0–6)
ERYTHROCYTE [DISTWIDTH] IN BLOOD BY AUTOMATED COUNT: 11.9 % (ref 11.6–15.1)
GFR SERPL CREATININE-BSD FRML MDRD: 70 ML/MIN/1.73SQ M
GLUCOSE SERPL-MCNC: 91 MG/DL (ref 65–140)
HCT VFR BLD AUTO: 51.2 % (ref 36.5–49.3)
HGB BLD-MCNC: 16.8 G/DL (ref 12–17)
IMM GRANULOCYTES # BLD AUTO: 0.05 THOUSAND/UL (ref 0–0.2)
IMM GRANULOCYTES NFR BLD AUTO: 0 % (ref 0–2)
LACTATE SERPL-SCNC: 1.2 MMOL/L (ref 0.5–2)
LYMPHOCYTES # BLD AUTO: 2.89 THOUSANDS/ÂΜL (ref 0.6–4.47)
LYMPHOCYTES NFR BLD AUTO: 25 % (ref 14–44)
MCH RBC QN AUTO: 29.9 PG (ref 26.8–34.3)
MCHC RBC AUTO-ENTMCNC: 32.8 G/DL (ref 31.4–37.4)
MCV RBC AUTO: 91 FL (ref 82–98)
MONOCYTES # BLD AUTO: 1.11 THOUSAND/ÂΜL (ref 0.17–1.22)
MONOCYTES NFR BLD AUTO: 10 % (ref 4–12)
NEUTROPHILS # BLD AUTO: 7.14 THOUSANDS/ÂΜL (ref 1.85–7.62)
NEUTS SEG NFR BLD AUTO: 63 % (ref 43–75)
NRBC BLD AUTO-RTO: 0 /100 WBCS
PLATELET # BLD AUTO: 330 THOUSANDS/UL (ref 149–390)
PMV BLD AUTO: 10.3 FL (ref 8.9–12.7)
POTASSIUM SERPL-SCNC: 4.4 MMOL/L (ref 3.5–5.3)
PROT SERPL-MCNC: 7.5 G/DL (ref 6.4–8.4)
RBC # BLD AUTO: 5.61 MILLION/UL (ref 3.88–5.62)
SODIUM SERPL-SCNC: 137 MMOL/L (ref 135–147)
WBC # BLD AUTO: 11.49 THOUSAND/UL (ref 4.31–10.16)

## 2023-02-18 RX ORDER — SULFAMETHOXAZOLE AND TRIMETHOPRIM 800; 160 MG/1; MG/1
1 TABLET ORAL 2 TIMES DAILY
Qty: 14 TABLET | Refills: 0 | Status: SHIPPED | OUTPATIENT
Start: 2023-02-18 | End: 2023-02-28

## 2023-02-18 RX ORDER — CEPHALEXIN 500 MG/1
500 CAPSULE ORAL EVERY 6 HOURS SCHEDULED
Qty: 40 CAPSULE | Refills: 0 | Status: SHIPPED | OUTPATIENT
Start: 2023-02-18 | End: 2023-02-28

## 2023-02-18 RX ADMIN — VANCOMYCIN HYDROCHLORIDE 2000 MG: 1 INJECTION, POWDER, LYOPHILIZED, FOR SOLUTION INTRAVENOUS at 13:28

## 2023-02-18 RX ADMIN — IOHEXOL 100 ML: 350 INJECTION, SOLUTION INTRAVENOUS at 13:10

## 2023-02-18 NOTE — ED PROVIDER NOTES
History  Chief Complaint   Patient presents with   • Leg Swelling     Pt said on Thursday he had a wound on his left leg that now is red, hot and swollen  Pt says its been increasing getting more red  54 y/o M presents to the ED for LLL swelling and redness ongoing x 2 days now  He states that he thinks he may have bumped his leg on something at the gym and subsequently with having open area and worsening redness to his left leg  He states that he woke up this morning feeling significantly worse w/ swelling and heat to his LLE  PMHX MDAD, ED          Prior to Admission Medications   Prescriptions Last Dose Informant Patient Reported? Taking? DULoxetine (Cymbalta) 30 mg delayed release capsule   No No   Sig: Take 1 capsule (30 mg total) by mouth daily   hydrOXYzine HCL (ATARAX) 50 mg tablet   No No   Sig: Take 1 tablet (50 mg total) by mouth 3 (three) times a day as needed for anxiety   meloxicam (MOBIC) 15 mg tablet   No No   Sig: Take 1 tablet (15 mg total) by mouth daily   tadalafil (CIALIS) 10 MG tablet   No No   Sig: Take 1 tablet (10 mg total) by mouth daily as needed for erectile dysfunction      Facility-Administered Medications: None       Past Medical History:   Diagnosis Date   • Anxiety    • Depression        History reviewed  No pertinent surgical history  Family History   Problem Relation Age of Onset   • Diabetes Mother    • Stroke Mother    • Hypertension Mother    • Rheum arthritis Mother    • Heart disease Father         cardiac disorder   • Cancer Father    • Diabetes Brother    • Other Family         CVA-due to other mechanism   • Arthritis Family    • Heart disease Family         cardiac disorder   • Diabetes Family    • Hypertension Family    • Thyroid disease Family    • Neuropathy Family         Generalized   • Substance Abuse Neg Hx    • Mental illness Neg Hx      I have reviewed and agree with the history as documented      E-Cigarette/Vaping   • E-Cigarette Use Never User E-Cigarette/Vaping Substances   • Nicotine No    • THC No    • CBD No    • Flavoring No      Social History     Tobacco Use   • Smoking status: Every Day     Packs/day: 0 50     Types: Cigarettes   • Smokeless tobacco: Never   Vaping Use   • Vaping Use: Never used   Substance Use Topics   • Alcohol use: No   • Drug use: No       Review of Systems   Constitutional: Negative for chills and fever  HENT: Negative for ear pain and sore throat  Eyes: Negative for pain and visual disturbance  Respiratory: Negative for cough and shortness of breath  Cardiovascular: Positive for leg swelling  Negative for chest pain and palpitations  Gastrointestinal: Negative for abdominal pain and vomiting  Genitourinary: Negative for dysuria and hematuria  Musculoskeletal: Negative for arthralgias and back pain  Skin: Positive for color change (with heat and redness) and wound  Negative for rash  Neurological: Negative for seizures and syncope  All other systems reviewed and are negative  Physical Exam  Physical Exam  Vitals and nursing note reviewed  Constitutional:       General: He is not in acute distress  Appearance: He is well-developed  HENT:      Head: Normocephalic and atraumatic  Eyes:      Conjunctiva/sclera: Conjunctivae normal    Cardiovascular:      Rate and Rhythm: Normal rate and regular rhythm  Heart sounds: No murmur heard  Pulmonary:      Effort: Pulmonary effort is normal  No respiratory distress  Breath sounds: Normal breath sounds  Abdominal:      Palpations: Abdomen is soft  Tenderness: There is no abdominal tenderness  Musculoskeletal:      Cervical back: Neck supple  Left lower leg: Swelling present  Edema (erythema with induration and head to anterior left lower leg) present  Skin:     General: Skin is warm and dry  Capillary Refill: Capillary refill takes less than 2 seconds  Neurological:      Mental Status: He is alert     Psychiatric: Mood and Affect: Mood normal                    This picture as above denotes true erythema margin as pointed out by marker point    Vital Signs  ED Triage Vitals [02/18/23 1123]   Temperature Pulse Respirations Blood Pressure SpO2   99 9 °F (37 7 °C) 79 18 125/74 98 %      Temp Source Heart Rate Source Patient Position - Orthostatic VS BP Location FiO2 (%)   Oral -- -- -- --      Pain Score       8           Vitals:    02/18/23 1123   BP: 125/74   Pulse: 79         Visual Acuity      ED Medications  Medications   vancomycin (VANCOCIN) 2000 mg in sodium chloride 0 9% 500 mL IVPB (0 mg Intravenous Stopped 2/18/23 1544)   iohexol (OMNIPAQUE) 350 MG/ML injection (SINGLE-DOSE) 100 mL (100 mL Intravenous Given 2/18/23 1310)       Diagnostic Studies  Results Reviewed     Procedure Component Value Units Date/Time    Blood culture #1 [[id number]] Collected: 02/18/23 1304    Lab Status: Preliminary result Specimen: Blood from Arm, Right Updated: 02/19/23 0102     Blood Culture Received in Microbiology Lab  Culture in Progress  Blood culture #2 [[id number]] Collected: 02/18/23 1328    Lab Status: Preliminary result Specimen: Blood from Arm, Left Updated: 02/19/23 0102     Blood Culture Received in Microbiology Lab  Culture in Progress  Wound culture and Gram stain [[id number]] Collected: 02/18/23 1539    Lab Status: In process Specimen: Wound from Leg, Right Updated: 02/18/23 1541    Lactic acid, plasma [[id number]]  (Normal) Collected: 02/18/23 1304    Lab Status: Final result Specimen: Blood from Arm, Right Updated: 02/18/23 1327     LACTIC ACID 1 2 mmol/L     Narrative:      Result may be elevated if tourniquet was used during collection      D-dimer, quantitative [[id number]]  (Abnormal) Collected: 02/18/23 1133    Lab Status: Final result Specimen: Blood from Arm, Left Updated: 02/18/23 1237     D-Dimer, Quant 0 51 ug/ml FEU     Comprehensive metabolic panel [[id number]] Collected: 02/18/23 1133    Lab Status: Final result Specimen: Blood from Arm, Left Updated: 02/18/23 1227     Sodium 137 mmol/L      Potassium 4 4 mmol/L      Chloride 103 mmol/L      CO2 26 mmol/L      ANION GAP 8 mmol/L      BUN 14 mg/dL      Creatinine 1 22 mg/dL      Glucose 91 mg/dL      Calcium 9 1 mg/dL      AST 23 U/L      ALT 38 U/L      Alkaline Phosphatase 55 U/L      Total Protein 7 5 g/dL      Albumin 4 2 g/dL      Total Bilirubin 0 77 mg/dL      eGFR 70 ml/min/1 73sq m     Narrative:      [place] guidelines for Chronic Kidney Disease (CKD):   •  Stage 1 with normal or high GFR (GFR > 90 mL/min/1 73 square meters)  •  Stage 2 Mild CKD (GFR = 60-89 mL/min/1 73 square meters)  •  Stage 3A Moderate CKD (GFR = 45-59 mL/min/1 73 square meters)  •  Stage 3B Moderate CKD (GFR = 30-44 mL/min/1 73 square meters)  •  Stage 4 Severe CKD (GFR = 15-29 mL/min/1 73 square meters)  •  Stage 5 End Stage CKD (GFR <15 mL/min/1 73 square meters)  Note: GFR calculation is accurate only with a steady state creatinine    CBC and differential [[id number]]  (Abnormal) Collected: 02/18/23 1133    Lab Status: Final result Specimen: Blood from Arm, Left Updated: 02/18/23 1140     WBC 11 49 Thousand/uL      RBC 5 61 Million/uL      Hemoglobin 16 8 g/dL      Hematocrit 51 2 %      MCV 91 fL      MCH 29 9 pg      MCHC 32 8 g/dL      RDW 11 9 %      MPV 10 3 fL      Platelets 790 Thousands/uL      nRBC 0 /100 WBCs      Neutrophils Relative 63 %      Immat GRANS % 0 %      Lymphocytes Relative 25 %      Monocytes Relative 10 %      Eosinophils Relative 2 %      Basophils Relative 0 %      Neutrophils Absolute 7 14 Thousands/µL      Immature Grans Absolute 0 05 Thousand/uL      Lymphocytes Absolute 2 89 Thousands/µL      Monocytes Absolute 1 11 Thousand/µL      Eosinophils Absolute 0 26 Thousand/µL      Basophils Absolute 0 04 Thousands/µL                  CT lower extremity w contrast left   Final Result by Nellie Naranjo MD (02/18 8163)   Mild subcutaneous edema and fluid about the left lower leg, ankle and foot compatible cellulitis  No rim-enhancing fluid collection to suggest abscess  Workstation performed: [id number]                    Procedures  ECG 12 Lead Documentation Only    Date/Time: 2/18/2023 11:59 AM  Performed by: Ellen Johnston PA-C  Authorized by: Ellen Johnston PA-C     ECG reviewed by me, the ED Provider: yes    Patient location:  ED  Previous ECG:     Comparison to cardiac monitor: Yes    Interpretation:     Interpretation: normal    Rate:     ECG rate assessment: normal    Rhythm:     Rhythm: sinus rhythm    Ectopy:     Ectopy: none    QRS:     QRS axis:  Right  Conduction:     Conduction: normal    ST segments:     ST segments:  Normal  T waves:     T waves: normal               ED Course  ED Course as of 02/19/23 0945   Sat Feb 18, 2023   1211 Pending labs     1252 Placing IV for CT /w IV contrast   1511 CT results finalized   0494 Pt has completed IV abx here in ED and stable for D/C once infusion finished  SBIRT 20yo+    Flowsheet Row Most Recent Value   SBIRT (23 yo +)    In order to provide better care to our patients, we are screening all of our patients for alcohol and drug use  Would it be okay to ask you these screening questions? Yes Filed at: 02/18/2023 1331   Initial Alcohol Screen: US AUDIT-C     1  How often do you have a drink containing alcohol? 0 Filed at: 02/18/2023 1331   2  How many drinks containing alcohol do you have on a typical day you are drinking? 0 Filed at: 02/18/2023 1331   3a  Male UNDER 65: How often do you have five or more drinks on one occasion? 0 Filed at: 02/18/2023 1331   3b  FEMALE Any Age, or MALE 65+: How often do you have 4 or more drinks on one occassion? 0 Filed at: 02/18/2023 1331   Audit-C Score 0 Filed at: 02/18/2023 1331   DON: How many times in the past year have you        Used an illegal drug or used a prescription medication for non-medical reasons? Never Filed at: 02/18/2023 2201                    Medical Decision Making  ED Course: IV ABX  Treatment: vancocin  DDX: considered cellulitis (confirmed) , considered abscess, NSTI   Historian:  Patient   Labs:  WBC elev / Stephanie Edwards pending   EKG self reviewed  Imaging/Diagnostics:  LLE CT w/ contrast  Discussion/Special Considerations:  Considered hospitalization for this patient but due to non high risk for MDRO HAMRSA can plan for initiation of outpatient therapy w/ Keflex / TMP SMZ in outpatient setting  Dispo:  Home w/ OP abx  RX: Keflex / TMP SMZ      Cellulitis: acute illness or injury  Amount and/or Complexity of Data Reviewed  Labs: ordered  Radiology: ordered  Risk  Prescription drug management  Disposition  Final diagnoses:   Cellulitis   Wound care instructions:    Change bandaging daily and wash , re-evaluate wound daily, and redress with Xeroform or nonstick dressing, coat wound with bacitracin, and secure with Kerlix and Coban  Return to emergency department if any signs or symptoms of wound infection including but not limited to extended spreading redness and pain, swelling, purulent discharge, fevers, etc     Discussed return emergency department for any newly developing or worsening signs or symptoms  Patient understood all instructions prior to discharge and plan agreed upon by patient and myself  Time reflects when diagnosis was documented in both MDM as applicable and the Disposition within this note     Time User Action Codes Description Comment    2/18/2023  3:13 PM Nataliia Woodson Add [L03 90] Cellulitis       ED Disposition     ED Disposition   Discharge    Condition   Stable    Date/Time   Sat Feb 18, 2023  3:13 PM    Comment   Courtney Mayer discharge to home/self care                 Follow-up Information     Follow up With Specialties Details Why Contact Info Additional [address],  Internal Medicine Call today Call to schedule follow up appointment this upcoming week  [place]  [address] [facility] [address] Emergency Department Emergency Medicine Go to  If symptoms worsen [address] [place],[address] Emergency Department, [address] [facility], [facility] [place] [address]          Discharge Medication List as of 2/18/2023  3:41 PM      START taking these medications    Details   cephalexin (KEFLEX) 500 mg capsule Take 1 capsule (500 mg total) by mouth every 6 (six) hours for 10 days, Starting Sat 2/18/2023, Until Tue 2/28/2023, Normal      sulfamethoxazole-trimethoprim (BACTRIM DS) 800-160 mg per tablet Take 1 tablet by mouth 2 (two) times a day for 10 days smx-tmp DS (BACTRIM) 800-160 mg tabs (1tab q12 D10), Starting Sat 2/18/2023, Until Tue 2/28/2023, Normal         CONTINUE these medications which have NOT CHANGED    Details   DULoxetine (Cymbalta) 30 mg delayed release capsule Take 1 capsule (30 mg total) by mouth daily, Starting Thu 1/5/2023, Normal      hydrOXYzine HCL (ATARAX) 50 mg tablet Take 1 tablet (50 mg total) by mouth 3 (three) times a day as needed for anxiety, Starting Thu 1/5/2023, Normal      meloxicam (MOBIC) 15 mg tablet Take 1 tablet (15 mg total) by mouth daily, Starting Thu 1/5/2023, Normal      tadalafil (CIALIS) 10 MG tablet Take 1 tablet (10 mg total) by mouth daily as needed for erectile dysfunction, Starting Mon 10/31/2022, Normal             No discharge procedures on file      PDMP Review     None          ED Provider  Electronically Signed by           Cisco Renteria PA-C  02/19/23 0974

## 2023-02-18 NOTE — Clinical Note
Watson Micsaritha was seen and treated in our emergency department on 2/18/2023  Diagnosis:     Arsaln Hylton  is off the rest of the shift today  He may return on this date: 02/20/2023         If you have any questions or concerns, please don't hesitate to call        Norbert Ortiz PA-C    ______________________________           _______________          _______________  Hospital Representative                              Date                                Time

## 2023-02-19 LAB
ATRIAL RATE: 76 BPM
P AXIS: 29 DEGREES
PR INTERVAL: 158 MS
QRS AXIS: 142 DEGREES
QRSD INTERVAL: 106 MS
QT INTERVAL: 372 MS
QTC INTERVAL: 418 MS
T WAVE AXIS: 20 DEGREES
VENTRICULAR RATE: 76 BPM

## 2023-02-21 LAB
BACTERIA WND AEROBE CULT: ABNORMAL
GRAM STN SPEC: ABNORMAL

## 2023-02-24 LAB
BACTERIA BLD CULT: NORMAL
BACTERIA BLD CULT: NORMAL

## 2023-03-16 ENCOUNTER — OFFICE VISIT (OUTPATIENT)
Dept: GASTROENTEROLOGY | Facility: CLINIC | Age: 48
End: 2023-03-16

## 2023-03-16 ENCOUNTER — TELEPHONE (OUTPATIENT)
Dept: GASTROENTEROLOGY | Facility: CLINIC | Age: 48
End: 2023-03-16

## 2023-03-16 VITALS
TEMPERATURE: 97.8 F | SYSTOLIC BLOOD PRESSURE: 110 MMHG | WEIGHT: 220 LBS | OXYGEN SATURATION: 98 % | HEIGHT: 69 IN | DIASTOLIC BLOOD PRESSURE: 78 MMHG | HEART RATE: 73 BPM | BODY MASS INDEX: 32.58 KG/M2

## 2023-03-16 DIAGNOSIS — Z20.5 EXPOSURE TO HEPATITIS C: ICD-10-CM

## 2023-03-16 DIAGNOSIS — B18.2 CHRONIC HEPATITIS C WITHOUT HEPATIC COMA (HCC): Primary | ICD-10-CM

## 2023-03-16 NOTE — PROGRESS NOTES
[facility] [facility] - Outpatient Consultation  Richelle Mayer 53 y o  male MRN: [id number]  Encounter: [id number]    PCP:  Radha Dunham, [address] [facility], [phone]  Referring Provider:  Santiago Thurman, [address] [place] [address] [phone]        ASSESSMENT AND PLAN:      Mr Yvon Bolton has chronic hepatitis C infection with as of yet untested genotype and has never been treated  Mr Yvon Bolton has no physical or laboratory evidence of chronic liver disease, cirrhosis or portal hypertension  I discussed with he the natural history of chronic hepatitis C infection, including the risk for progression of ongoing liver damage to cirrhosis and its complications, as well the current treatment options including Mavyret, Eplcusa and Vosevi  We also dicussed how chronic hepatitis C is transmitted and to prevent transmission  Mr Yvon Bolton is interested in being evaluated for treatment  I have requested the labs outlined below, required as part of pre-treatment evaluation, and often for insurance authorization     I have also requested blood work to rule out other causes of chronic liver disease including and hepatitis A and B (acute, chronic and/or immunity testing)  I have also requested lab-based HCV fibrosure/fibrotest and abdominal ultrasound with elastography to non-invasively stage hepatic fibrosis  I counseled Mr Yvon Bolton on the importance of refraining from any drugs or alcohol to limit risk of liver disease progression  Mr Yvon Bolton will have the above mentioned studies  When results are back, our Hepatology nurse, Johnson Bal, will contact he with further instructions on treatment  he is aware to not start treatment if medications are delivered to their residence until further discussion with our team      FOLLOW-UP:  Return in about 6 months (around 9/16/2023)  VISIT DIAGNOSES AND ORDERS:      1  Chronic hepatitis C without hepatic coma ([facility] [address] )    2   Exposure to hepatitis C      Orders Placed This Encounter   Procedures   • US elastography   • US abdomen complete   • CBC and differential   • Protime-INR   • Comprehensive metabolic panel   • Hepatitis C RNA, quantitative, PCR   • Hepatitis panel, acute   • Hepatitis C genotype   • Hepatitis B surface antibody   • Hepatitis A antibody, total   • HCV Megan Urbina     ______________________________________________________________________    HPI:  Mr Shelbi Lange is a 53 y o  male with medical history as outlined below, including general anxiety, arthritic pain in left shoulder, who comes in today for initial consultation for management of chronic hepatitis C  Mr Leonela Schreiber states he was diagnosed with chronic hepatitis C infection 15 years ago  Risk factors include sexual exposure  He denies history of IV or intranasal drug use  Mr Leonela Schreiber has never been treated  Mr Leonela Schreiber has no history of heavy alcohol use  He denies recent drug use  Mr Leonela Schreiber has no known history of liver disease other than chronic HCV  Mr Leonela Schreiber denies recent or history of yellow eyes/skin, dark urine, GI bleeding, abdominal distention with fluid, lower extremity swelling, easy bruising, excessive bleeding, pruritus or confusion  he denies abdomina pain, nausea, vomiting, heartburn, reflux, difficulty swallowing, early satiety, bloating, diarrhea, constipation or straining with passing stools  REVIEW OF SYSTEMS:    Review of Systems   Musculoskeletal: Positive for arthralgias and back pain  All other systems reviewed and are negative  Historical Information   Patient Active Problem List   Diagnosis   • Bulging lumbar disc   • Myofascial pain syndrome   • Osteoarthritis of spine with radiculopathy, lumbar region   • Generalized anxiety disorder   • Acute bronchitis   • Bilateral ankle joint pain   • Exposure to hepatitis C   • Tobacco use disorder     Past Medical History:   Diagnosis Date   • Anxiety    • Depression      History reviewed  No pertinent surgical history  Social History   Social History     Substance and Sexual Activity   Alcohol Use No     Social History     Substance and Sexual Activity   Drug Use No     Social History     Tobacco Use   Smoking Status Every Day   • Packs/day: 0 50   • Types: Cigarettes   Smokeless Tobacco Never     Family History   Problem Relation Age of Onset   • Diabetes Mother    • Stroke Mother    • Hypertension Mother    • Rheum arthritis Mother    • Heart disease Father         cardiac disorder   • Cancer Father    • Diabetes Brother    • Colon polyps Maternal Uncle    • Other Family         CVA-due to other mechanism   • Arthritis Family    • Heart disease Family         cardiac disorder   • Diabetes Family    • Hypertension Family    • Thyroid disease Family    • Neuropathy Family         Generalized   • Substance Abuse Neg Hx    • Mental illness Neg Hx    • Colon cancer Neg Hx        Meds/Allergies       Current Outpatient Medications:   •  DULoxetine (Cymbalta) 30 mg delayed release capsule  •  hydrOXYzine HCL (ATARAX) 50 mg tablet  •  meloxicam (MOBIC) 15 mg tablet  •  tadalafil (CIALIS) 10 MG tablet    No Known Allergies        Objective     Blood pressure 110/78, pulse 73, temperature 97 8 °F (36 6 °C), height 5' 9" (1 753 m), weight 99 8 kg (220 lb), SpO2 98 %  Body mass index is 32 49 kg/m²  PHYSICAL EXAM:           Physical Exam  Vitals reviewed  Constitutional:       General: He is not in acute distress  Appearance: He is not ill-appearing  HENT:      Head: Normocephalic and atraumatic  Nose: Nose normal       Mouth/Throat:      Pharynx: Oropharynx is clear  No posterior oropharyngeal erythema  Eyes:      General: No scleral icterus  Extraocular Movements: Extraocular movements intact  Cardiovascular:      Rate and Rhythm: Normal rate and regular rhythm  Heart sounds: No murmur heard  Pulmonary:      Effort: Pulmonary effort is normal  No respiratory distress  Breath sounds: Normal breath sounds  No rales  Abdominal:      General: There is distension (mild abd obesity)  Palpations: Abdomen is soft  There is no shifting dullness, fluid wave, hepatomegaly or splenomegaly  Tenderness: There is no abdominal tenderness  There is no guarding  Musculoskeletal:         General: No swelling  Normal range of motion  Cervical back: Normal range of motion and neck supple  Skin:     General: Skin is warm  Coloration: Skin is not jaundiced  Neurological:      General: No focal deficit present  Mental Status: He is oriented to person, place, and time  Psychiatric:         Mood and Affect: Mood normal               Lab Results:   Lab Results   Component Value Date    K 4 4 02/18/2023    CO2 26 02/18/2023     02/18/2023    BUN 14 02/18/2023    CREATININE 1 22 02/18/2023     Lab Results   Component Value Date    WBC 11 49 (H) 02/18/2023    HGB 16 8 02/18/2023    HCT 51 2 (H) 02/18/2023    MCV 91 02/18/2023     02/18/2023     Lab Results   Component Value Date    TP 7 5 02/18/2023    AST 23 02/18/2023    ALT 38 02/18/2023      No results found for: AFP, IRON, LABIRON, FERRITIN  Lab Results   Component Value Date    HDL 42 10/24/2022    TRIG 73 10/24/2022       Radiology Results:   I have reviewed the results of any radiology studies performed within the last 90 days  This includes:      CT lower extremity w contrast left    Result Date: 2/18/2023  Narrative: CT left lower extremity from the distal left femur through the foot  With IV contrast INDICATION: left leg cellulitis  COMPARISON: Left ankle radiograph 10/21/2022 TECHNIQUE: CT examination of the above was performed  This examination, like all CT scans performed in the [facility], was performed utilizing techniques to minimize radiation dose exposure, including the use of iterative reconstruction  and automated exposure control software    Sagittal and coronal two dimensional reconstructed images were also submitted for interpretation  IV Contrast: 100 mL of iohexol (OMNIPAQUE) Rad dose  1445 99 mGy-cm FINDINGS: OSSEOUS STRUCTURES: Evaluation is limited by motion artifact  No fracture, dislocation or destructive osseous lesion  VISUALIZED MUSCULATURE:  Unremarkable  SOFT TISSUES:  There is mild subcutaneous edema and subcutaneous fat stranding about the left lower leg, ankle and foot  There is no rim-enhancing fluid collection to suggest abscess  OTHER PERTINENT FINDINGS:  None  Impression: Mild subcutaneous edema and fluid about the left lower leg, ankle and foot compatible cellulitis  No rim-enhancing fluid collection to suggest abscess   Workstation performed: [id number]         Michael Sunshine MD  Hepatology/Gastroenterology

## 2023-03-21 ENCOUNTER — LAB (OUTPATIENT)
Dept: LAB | Facility: HOSPITAL | Age: 48
End: 2023-03-21

## 2023-03-21 DIAGNOSIS — B18.2 CHRONIC HEPATITIS C WITHOUT HEPATIC COMA (HCC): ICD-10-CM

## 2023-03-21 LAB
ALBUMIN SERPL BCP-MCNC: 4.4 G/DL (ref 3.5–5)
ALP SERPL-CCNC: 56 U/L (ref 46–116)
ALT SERPL W P-5'-P-CCNC: 74 U/L (ref 12–78)
ANION GAP SERPL CALCULATED.3IONS-SCNC: 2 MMOL/L (ref 4–13)
AST SERPL W P-5'-P-CCNC: 36 U/L (ref 5–45)
BASOPHILS # BLD AUTO: 0.07 THOUSANDS/ÂΜL (ref 0–0.1)
BASOPHILS NFR BLD AUTO: 1 % (ref 0–1)
BILIRUB SERPL-MCNC: 0.84 MG/DL (ref 0.2–1)
BUN SERPL-MCNC: 11 MG/DL (ref 5–25)
CALCIUM SERPL-MCNC: 9.9 MG/DL (ref 8.3–10.1)
CHLORIDE SERPL-SCNC: 106 MMOL/L (ref 96–108)
CO2 SERPL-SCNC: 28 MMOL/L (ref 21–32)
CREAT SERPL-MCNC: 1.1 MG/DL (ref 0.6–1.3)
EOSINOPHIL # BLD AUTO: 0.2 THOUSAND/ÂΜL (ref 0–0.61)
EOSINOPHIL NFR BLD AUTO: 3 % (ref 0–6)
ERYTHROCYTE [DISTWIDTH] IN BLOOD BY AUTOMATED COUNT: 12.1 % (ref 11.6–15.1)
GFR SERPL CREATININE-BSD FRML MDRD: 79 ML/MIN/1.73SQ M
GLUCOSE P FAST SERPL-MCNC: 80 MG/DL (ref 65–99)
HAV AB SER QL IA: REACTIVE
HAV IGM SER QL: ABNORMAL
HBV CORE IGM SER QL: ABNORMAL
HBV SURFACE AB SER-ACNC: 5.56 MIU/ML
HBV SURFACE AG SER QL: ABNORMAL
HCT VFR BLD AUTO: 49.1 % (ref 36.5–49.3)
HCV AB SER QL: REACTIVE
HGB BLD-MCNC: 16.2 G/DL (ref 12–17)
IMM GRANULOCYTES # BLD AUTO: 0.02 THOUSAND/UL (ref 0–0.2)
IMM GRANULOCYTES NFR BLD AUTO: 0 % (ref 0–2)
INR PPP: 0.96 (ref 0.84–1.19)
LYMPHOCYTES # BLD AUTO: 3.23 THOUSANDS/ÂΜL (ref 0.6–4.47)
LYMPHOCYTES NFR BLD AUTO: 45 % (ref 14–44)
MCH RBC QN AUTO: 29.7 PG (ref 26.8–34.3)
MCHC RBC AUTO-ENTMCNC: 33 G/DL (ref 31.4–37.4)
MCV RBC AUTO: 90 FL (ref 82–98)
MONOCYTES # BLD AUTO: 0.61 THOUSAND/ÂΜL (ref 0.17–1.22)
MONOCYTES NFR BLD AUTO: 8 % (ref 4–12)
NEUTROPHILS # BLD AUTO: 3.14 THOUSANDS/ÂΜL (ref 1.85–7.62)
NEUTS SEG NFR BLD AUTO: 43 % (ref 43–75)
NRBC BLD AUTO-RTO: 0 /100 WBCS
PLATELET # BLD AUTO: 286 THOUSANDS/UL (ref 149–390)
PMV BLD AUTO: 11.2 FL (ref 8.9–12.7)
POTASSIUM SERPL-SCNC: 4.1 MMOL/L (ref 3.5–5.3)
PROT SERPL-MCNC: 7.8 G/DL (ref 6.4–8.4)
PROTHROMBIN TIME: 13 SECONDS (ref 11.6–14.5)
RBC # BLD AUTO: 5.46 MILLION/UL (ref 3.88–5.62)
SODIUM SERPL-SCNC: 136 MMOL/L (ref 135–147)
WBC # BLD AUTO: 7.27 THOUSAND/UL (ref 4.31–10.16)

## 2023-03-23 LAB
HCV GENTYP SERPL NAA+PROBE: NORMAL
HCV PLEASE NOTE: NORMAL
HCV RNA SERPL NAA+PROBE-ACNC: NORMAL IU/ML
HCV RNA SERPL NAA+PROBE-LOG IU: 4.79 LOG10 IU/ML
TEST INFORMATION: NORMAL

## 2023-03-24 LAB
A2 MACROGLOB SERPL-MCNC: 202 MG/DL (ref 110–276)
ALT SERPL W P-5'-P-CCNC: 72 IU/L (ref 0–55)
APO A-I SERPL-MCNC: 129 MG/DL (ref 101–178)
BILIRUB SERPL-MCNC: 0.6 MG/DL (ref 0–1.2)
COMMENT: ABNORMAL
FIBROSIS SCORING:: ABNORMAL
FIBROSIS STAGE SERPL QL: ABNORMAL
GGT SERPL-CCNC: 38 IU/L (ref 0–65)
HAPTOGLOB SERPL-MCNC: 111 MG/DL (ref 23–355)
INTERPRETATIONS: ABNORMAL
LIVER FIBR SCORE SERPL CALC.FIBROSURE: 0.31 (ref 0–0.21)
NECROINFLAMM ACTIVITY SCORING:: ABNORMAL
NECROINFLAMMATORY ACT GRADE SERPL QL: ABNORMAL
NECROINFLAMMATORY ACT SCORE SERPL: 0.44 (ref 0–0.17)
SERVICE CMNT-IMP: ABNORMAL

## 2023-05-14 ENCOUNTER — HOSPITAL ENCOUNTER (EMERGENCY)
Facility: HOSPITAL | Age: 48
Discharge: HOME | End: 2023-05-14
Attending: EMERGENCY MEDICINE

## 2023-05-14 VITALS
HEART RATE: 88 BPM | DIASTOLIC BLOOD PRESSURE: 76 MMHG | BODY MASS INDEX: 32.49 KG/M2 | WEIGHT: 220 LBS | RESPIRATION RATE: 18 BRPM | OXYGEN SATURATION: 95 % | SYSTOLIC BLOOD PRESSURE: 143 MMHG | TEMPERATURE: 97.5 F

## 2023-05-14 DIAGNOSIS — L03.90 CELLULITIS: Primary | ICD-10-CM

## 2023-05-14 RX ORDER — DOXYCYCLINE HYCLATE 100 MG/1
100 CAPSULE ORAL 2 TIMES DAILY
Qty: 14 CAPSULE | Refills: 0 | Status: SHIPPED | OUTPATIENT
Start: 2023-05-14 | End: 2023-05-14 | Stop reason: SDUPTHER

## 2023-05-14 RX ORDER — CHLORHEXIDINE GLUCONATE 4 G/100ML
5 SOLUTION TOPICAL DAILY PRN
Qty: 120 ML | Refills: 0 | Status: SHIPPED | OUTPATIENT
Start: 2023-05-14 | End: 2023-05-14 | Stop reason: SDUPTHER

## 2023-05-14 RX ORDER — DOXYCYCLINE HYCLATE 100 MG/1
100 CAPSULE ORAL 2 TIMES DAILY
Qty: 14 CAPSULE | Refills: 0 | Status: SHIPPED | OUTPATIENT
Start: 2023-05-14 | End: 2023-05-21

## 2023-05-14 RX ORDER — CHLORHEXIDINE GLUCONATE 4 G/100ML
5 SOLUTION TOPICAL DAILY PRN
Qty: 120 ML | Refills: 0 | Status: SHIPPED | OUTPATIENT
Start: 2023-05-14

## 2023-05-14 NOTE — ED PROVIDER NOTES
History  Chief Complaint   Patient presents with   • Abscess     Pt to ED c/o abscess on back that he noticed on Friday  Pt states his arms feel tight and weak because of the abscess  HPI     52 y/o M w/ PMHX MDAD / Anxiety / Recurrent skin cellulitis presents w/ skin infection started a few days ago this past Friday on his back with worsening pain and irritation  He states he has R shoulder pain and hurts to move his R arm due to soreness but denies weakness  He denies fevers, chills, vomitting, numbness or extremity weakness  Prior to Admission Medications   Prescriptions Last Dose Informant Patient Reported? Taking? DULoxetine (Cymbalta) 30 mg delayed release capsule   No No   Sig: Take 1 capsule (30 mg total) by mouth daily   Patient not taking: Reported on 3/16/2023   Sofosbuvir-Velpatasvir (Epclusa) 400-100 MG tablet   No No   Sig: Take 1 tablet by mouth daily   hydrOXYzine HCL (ATARAX) 50 mg tablet   No No   Sig: Take 1 tablet (50 mg total) by mouth 3 (three) times a day as needed for anxiety   Patient not taking: Reported on 3/16/2023   meloxicam (MOBIC) 15 mg tablet   No No   Sig: Take 1 tablet (15 mg total) by mouth daily   Patient not taking: Reported on 3/16/2023   tadalafil (CIALIS) 10 MG tablet   No No   Sig: Take 1 tablet (10 mg total) by mouth daily as needed for erectile dysfunction   Patient not taking: Reported on 3/16/2023      Facility-Administered Medications: None       Past Medical History:   Diagnosis Date   • Anxiety    • Depression        History reviewed  No pertinent surgical history      Family History   Problem Relation Age of Onset   • Diabetes Mother    • Stroke Mother    • Hypertension Mother    • Rheum arthritis Mother    • Heart disease Father         cardiac disorder   • Cancer Father    • Diabetes Brother    • Colon polyps Maternal Uncle    • Other Family         CVA-due to other mechanism   • Arthritis Family    • Heart disease Family         cardiac disorder   • Diabetes Family    • Hypertension Family    • Thyroid disease Family    • Neuropathy Family         Generalized   • Substance Abuse Neg Hx    • Mental illness Neg Hx    • Colon cancer Neg Hx      I have reviewed and agree with the history as documented  E-Cigarette/Vaping   • E-Cigarette Use Never User      E-Cigarette/Vaping Substances   • Nicotine No    • THC No    • CBD No    • Flavoring No      Social History     Tobacco Use   • Smoking status: Every Day     Packs/day: 0 50     Types: Cigarettes   • Smokeless tobacco: Never   Vaping Use   • Vaping Use: Never used   Substance Use Topics   • Alcohol use: No   • Drug use: No       Review of Systems   Constitutional: Negative for chills and fever  HENT: Negative for ear pain and sore throat  Eyes: Negative for pain and visual disturbance  Respiratory: Negative for cough and shortness of breath  Cardiovascular: Negative for chest pain and palpitations  Gastrointestinal: Negative for abdominal pain and vomiting  Genitourinary: Negative for dysuria and hematuria  Musculoskeletal: Negative for arthralgias and back pain  Skin: Positive for rash  Negative for color change  Neurological: Negative for seizures and syncope  All other systems reviewed and are negative  Physical Exam  Physical Exam  Constitutional:       Appearance: He is well-developed  HENT:      Head: Normocephalic and atraumatic  Eyes:      Conjunctiva/sclera: Conjunctivae normal       Pupils: Pupils are equal, round, and reactive to light  Cardiovascular:      Rate and Rhythm: Normal rate and regular rhythm  Heart sounds: Normal heart sounds  Pulmonary:      Effort: Pulmonary effort is normal       Breath sounds: Normal breath sounds  Abdominal:      General: Bowel sounds are normal       Palpations: Abdomen is soft  Musculoskeletal:         General: Normal range of motion  Cervical back: Normal range of motion and neck supple     Skin:     General: Skin is warm and dry  Neurological:      Mental Status: He is alert and oriented to person, place, and time  Vital Signs  ED Triage Vitals   Temperature Pulse Respirations Blood Pressure SpO2   05/14/23 0815 05/14/23 0815 05/14/23 0815 05/14/23 0815 05/14/23 0815   97 5 °F (36 4 °C) 90 18 144/88 98 %      Temp Source Heart Rate Source Patient Position - Orthostatic VS BP Location FiO2 (%)   05/14/23 0815 05/14/23 0830 05/14/23 0815 05/14/23 0815 --   Temporal Monitor Sitting Left arm       Pain Score       05/14/23 0815       8           Vitals:    05/14/23 0815 05/14/23 0830   BP: 144/88 143/76   Pulse: 90 88   Patient Position - Orthostatic VS: Sitting          Visual Acuity      ED Medications  Medications - No data to display    Diagnostic Studies  Results Reviewed     None                 No orders to display              Procedures  POC MSK/Soft Tissue US    Date/Time: 5/14/2023 8:30 AM  Performed by: Mervat Gaines PA-C  Authorized by: Mervat Gaines PA-C     Patient location:  Bedside  Performed by:  NP/PA  Procedure:     Performed: soft tissue ultrasound    Procedure details:     Exam Type:  Diagnostic    Longitudinal view:  Obtained    Transverse view:  Obtained    Image quality: diagnostic      Image availability:  Still images obtained  Soft tissue ultrasound:     Soft tissue indications: swelling, pain and erythema      Anatomic location:  Upper back    Soft tissue findings: cobblestoning    Interpretation:     Soft tissue impressions: consistent with cellulitis    Comments:      No subcutaneous fluid collection  ED Course              Stable ED course without worsening condition or deterioration  SBIRT 20yo+    Flowsheet Row Most Recent Value   Initial Alcohol Screen: US AUDIT-C     1  How often do you have a drink containing alcohol? 0 Filed at: 05/14/2023 0815   2   How many drinks containing alcohol do you have on a typical day you are drinking? 0 Filed at: 05/14/2023 0815   3a  Male UNDER 65: How often do you have five or more drinks on one occasion? 0 Filed at: 05/14/2023 0815   3b  FEMALE Any Age, or MALE 65+: How often do you have 4 or more drinks on one occassion? 0 Filed at: 05/14/2023 0815   Audit-C Score 0 Filed at: 05/14/2023 9041   DON: How many times in the past year have you    Used an illegal drug or used a prescription medication for non-medical reasons? Never Filed at: 05/14/2023 0815                    Medical Decision Making  Pt w/ prior visit on 2/18/23 w/ recurrent skin infection prior treated w/ Keflex / Bactrim  Pt avidarlene gym goer @ [employer]  Discussed no discrete abscess on imaging  Doxy coverage for MRSA and recommended outpatient testing for nasal swab  Cellulitis: acute illness or injury  Risk  OTC drugs  Prescription drug management  Disposition  Final diagnoses:   Cellulitis     Time reflects when diagnosis was documented in both MDM as applicable and the Disposition within this note     Time User Action Codes Description Comment    5/14/2023  8:36 AM Leslie Yusuf Add [L03 312] Cellulitis of back except buttock     5/14/2023  8:36 AM Leslie Yusuf Remove [L03 312] Cellulitis of back except buttock     5/14/2023  8:37 AM Leslie Yusuf Add [L03 312] Cellulitis of back except buttock     5/14/2023  8:37 AM Leslie Yusuf Remove [X31 408] Cellulitis of back except buttock     5/14/2023  8:37 AM Leslie Yusuf Add [L03 90] Cellulitis       ED Disposition     ED Disposition   Discharge    Condition   Stable    Date/Time   Sun May 14, 2023  8:36 AM    Comment   Hira Mayer discharge to home/self care  Follow-up Information     Follow up With Specialties Details Why Contact Info Additional [address] Vasyl Coates, DO Internal Medicine Call today call for follow up appointment   [place]  [address] [facility]  [id number] [place] [address] [address] [facility] [address] [address] Emergency Department Emergency Medicine  If symptoms worsen [address] [place],[address] [address]  [address] [facility] Emergency Department, [address] [place], [facility] [address]          Discharge Medication List as of 5/14/2023  8:43 AM      START taking these medications    Details   chlorhexidine (HIBICLENS) 4 % external liquid Apply 5 application  topically daily as needed for wound care, Starting Sun 5/14/2023, Normal      doxycycline hyclate (VIBRAMYCIN) 100 mg capsule Take 1 capsule (100 mg total) by mouth 2 (two) times a day for 7 days, Starting Sun 5/14/2023, Until Sun 5/21/2023, Normal         CONTINUE these medications which have NOT CHANGED    Details   DULoxetine (Cymbalta) 30 mg delayed release capsule Take 1 capsule (30 mg total) by mouth daily, Starting Thu 1/5/2023, Normal      hydrOXYzine HCL (ATARAX) 50 mg tablet Take 1 tablet (50 mg total) by mouth 3 (three) times a day as needed for anxiety, Starting Thu 1/5/2023, Normal      meloxicam (MOBIC) 15 mg tablet Take 1 tablet (15 mg total) by mouth daily, Starting Thu 1/5/2023, Normal      Sofosbuvir-Velpatasvir (Epclusa) 400-100 MG tablet Take 1 tablet by mouth daily, Starting Sat 4/8/2023, Until Sat 7/1/2023, Normal      tadalafil (CIALIS) 10 MG tablet Take 1 tablet (10 mg total) by mouth daily as needed for erectile dysfunction, Starting Mon 10/31/2022, Normal             No discharge procedures on file      PDMP Review     None          ED Provider  Electronically Signed by           Philly Harkins PA-C  05/14/23 [id number]

## 2023-05-24 ENCOUNTER — APPOINTMENT (OUTPATIENT)
Dept: LAB | Facility: HOSPITAL | Age: 48
End: 2023-05-24

## 2023-05-24 DIAGNOSIS — B18.2 CHRONIC HEPATITIS C WITHOUT HEPATIC COMA (HCC): ICD-10-CM

## 2023-05-24 LAB
ALBUMIN SERPL BCP-MCNC: 4.1 G/DL (ref 3.5–5)
ALP SERPL-CCNC: 63 U/L (ref 46–116)
ALT SERPL W P-5'-P-CCNC: 26 U/L (ref 12–78)
ANION GAP SERPL CALCULATED.3IONS-SCNC: -1 MMOL/L (ref 4–13)
AST SERPL W P-5'-P-CCNC: 21 U/L (ref 5–45)
BASOPHILS # BLD AUTO: 0.07 THOUSANDS/ÂΜL (ref 0–0.1)
BASOPHILS NFR BLD AUTO: 1 % (ref 0–1)
BILIRUB SERPL-MCNC: 0.45 MG/DL (ref 0.2–1)
BUN SERPL-MCNC: 10 MG/DL (ref 5–25)
CALCIUM SERPL-MCNC: 9.4 MG/DL (ref 8.3–10.1)
CHLORIDE SERPL-SCNC: 105 MMOL/L (ref 96–108)
CO2 SERPL-SCNC: 30 MMOL/L (ref 21–32)
CREAT SERPL-MCNC: 1.2 MG/DL (ref 0.6–1.3)
EOSINOPHIL # BLD AUTO: 0.31 THOUSAND/ÂΜL (ref 0–0.61)
EOSINOPHIL NFR BLD AUTO: 4 % (ref 0–6)
ERYTHROCYTE [DISTWIDTH] IN BLOOD BY AUTOMATED COUNT: 12 % (ref 11.6–15.1)
GFR SERPL CREATININE-BSD FRML MDRD: 71 ML/MIN/1.73SQ M
GLUCOSE P FAST SERPL-MCNC: 82 MG/DL (ref 65–99)
HCT VFR BLD AUTO: 49.9 % (ref 36.5–49.3)
HGB BLD-MCNC: 16.7 G/DL (ref 12–17)
IMM GRANULOCYTES # BLD AUTO: 0.04 THOUSAND/UL (ref 0–0.2)
IMM GRANULOCYTES NFR BLD AUTO: 1 % (ref 0–2)
LYMPHOCYTES # BLD AUTO: 2.5 THOUSANDS/ÂΜL (ref 0.6–4.47)
LYMPHOCYTES NFR BLD AUTO: 32 % (ref 14–44)
MCH RBC QN AUTO: 29.8 PG (ref 26.8–34.3)
MCHC RBC AUTO-ENTMCNC: 33.5 G/DL (ref 31.4–37.4)
MCV RBC AUTO: 89 FL (ref 82–98)
MONOCYTES # BLD AUTO: 0.75 THOUSAND/ÂΜL (ref 0.17–1.22)
MONOCYTES NFR BLD AUTO: 10 % (ref 4–12)
NEUTROPHILS # BLD AUTO: 4.25 THOUSANDS/ÂΜL (ref 1.85–7.62)
NEUTS SEG NFR BLD AUTO: 52 % (ref 43–75)
NRBC BLD AUTO-RTO: 0 /100 WBCS
PLATELET # BLD AUTO: 391 THOUSANDS/UL (ref 149–390)
PMV BLD AUTO: 10.5 FL (ref 8.9–12.7)
POTASSIUM SERPL-SCNC: 3.8 MMOL/L (ref 3.5–5.3)
PROT SERPL-MCNC: 7.9 G/DL (ref 6.4–8.4)
RBC # BLD AUTO: 5.61 MILLION/UL (ref 3.88–5.62)
SODIUM SERPL-SCNC: 134 MMOL/L (ref 135–147)
WBC # BLD AUTO: 7.92 THOUSAND/UL (ref 4.31–10.16)

## 2023-05-25 LAB
HCV RNA SERPL NAA+PROBE-ACNC: NORMAL IU/ML
TEST INFORMATION: NORMAL

## 2023-11-06 ENCOUNTER — APPOINTMENT (OUTPATIENT)
Dept: LAB | Facility: HOSPITAL | Age: 48
End: 2023-11-06
Payer: COMMERCIAL

## 2023-11-06 DIAGNOSIS — B18.2 CHRONIC HEPATITIS C WITHOUT HEPATIC COMA (HCC): ICD-10-CM

## 2023-11-06 PROCEDURE — 87522 HEPATITIS C REVRS TRNSCRPJ: CPT

## 2023-11-06 PROCEDURE — 36415 COLL VENOUS BLD VENIPUNCTURE: CPT

## 2023-11-08 LAB
HCV RNA SERPL NAA+PROBE-ACNC: NORMAL IU/ML
TEST INFORMATION: NORMAL

## 2023-11-09 ENCOUNTER — TELEPHONE (OUTPATIENT)
Dept: GASTROENTEROLOGY | Facility: CLINIC | Age: 48
End: 2023-11-09

## 2023-11-09 NOTE — TELEPHONE ENCOUNTER
Left message for patient telling him he missed his appointment with dr. Alesha Liao. Told him to call to reschedule.

## 2024-04-29 ENCOUNTER — APPOINTMENT (OUTPATIENT)
Dept: URGENT CARE | Facility: CLINIC | Age: 49
End: 2024-04-29
Payer: COMMERCIAL

## 2024-04-29 PROCEDURE — 90471 IMMUNIZATION ADMIN: CPT

## 2024-04-29 PROCEDURE — 90746 HEPB VACCINE 3 DOSE ADULT IM: CPT

## 2025-01-14 ENCOUNTER — HOSPITAL ENCOUNTER (EMERGENCY)
Facility: HOSPITAL | Age: 50
Discharge: HOME | End: 2025-01-14
Attending: EMERGENCY MEDICINE

## 2025-01-14 VITALS
TEMPERATURE: 97.6 F | HEART RATE: 66 BPM | OXYGEN SATURATION: 96 % | SYSTOLIC BLOOD PRESSURE: 126 MMHG | DIASTOLIC BLOOD PRESSURE: 72 MMHG | RESPIRATION RATE: 18 BRPM

## 2025-01-14 DIAGNOSIS — T25.232A: ICD-10-CM

## 2025-01-14 DIAGNOSIS — T25.132A: ICD-10-CM

## 2025-01-14 DIAGNOSIS — T25.232A: Primary | ICD-10-CM

## 2025-01-14 PROCEDURE — 90471 IMMUNIZATION ADMIN: CPT

## 2025-01-14 PROCEDURE — 99283 EMERGENCY DEPT VISIT LOW MDM: CPT

## 2025-01-14 PROCEDURE — 99284 EMERGENCY DEPT VISIT MOD MDM: CPT | Performed by: PHYSICIAN ASSISTANT

## 2025-01-14 PROCEDURE — 90715 TDAP VACCINE 7 YRS/> IM: CPT | Performed by: PHYSICIAN ASSISTANT

## 2025-01-14 RX ORDER — GINSENG 100 MG
1 CAPSULE ORAL ONCE
Status: COMPLETED | OUTPATIENT
Start: 2025-01-14 | End: 2025-01-14

## 2025-01-14 RX ADMIN — TETANUS TOXOID, REDUCED DIPHTHERIA TOXOID AND ACELLULAR PERTUSSIS VACCINE, ADSORBED 0.5 ML: 5; 2.5; 8; 8; 2.5 SUSPENSION INTRAMUSCULAR at 15:05

## 2025-01-14 RX ADMIN — BACITRACIN 1 LARGE APPLICATION: 500 OINTMENT TOPICAL at 15:11

## 2025-01-14 NOTE — Clinical Note
Jonathan Mayer was seen and treated in our emergency department on 1/14/2025.                Diagnosis:     Jonathan  may return to work on return date.    He may return on this date: 01/17/2025         If you have any questions or concerns, please don't hesitate to call.      Alyssa Lara PA-C    ______________________________           _______________          _______________  Hospital Representative                              Date                                Time
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
## 2025-01-14 NOTE — ED PROVIDER NOTES
Time reflects when diagnosis was documented in both MDM as applicable and the Disposition within this note       Time User Action Codes Description Comment    1/14/2025  3:05 PM Alyssa Lara Add [T25.232A] Second degree burn of fourth toe of left foot     1/14/2025  3:05 PM Alyssa Lara Add [T25.232A] Second degree burn of third toe of left foot     1/14/2025  3:05 PM Alyssa Lara Add [T25.232A] Second degree burn of second toe of left foot     1/14/2025  3:05 PM Alyssa Lara Add [T25.132A] First degree burn of toe of left foot     1/14/2025  3:05 PM Alyssa Lara Modify [T25.132A] First degree burn of toe of left foot 5th toe          ED Disposition       ED Disposition   Discharge    Condition   Stable    Date/Time   Tue Jan 14, 2025  3:04 PM    Comment   Jonathan Mayer discharge to home/self care.                   Assessment & Plan       Medical Decision Making  Patient with second degree burn to dorsal toes, will update tetanus, cleanse and refer to burn center for further treatment.     Risk  OTC drugs.  Prescription drug management.             Medications   tetanus-diphtheria-acellular pertussis (BOOSTRIX) IM injection 0.5 mL (0.5 mL Intramuscular Given 1/14/25 1505)   bacitracin topical ointment 1 large application (1 large application Topical Given 1/14/25 1511)       ED Risk Strat Scores                          SBIRT 22yo+      Flowsheet Row Most Recent Value   Initial Alcohol Screen: US AUDIT-C     1. How often do you have a drink containing alcohol? 0 Filed at: 01/14/2025 1512   2. How many drinks containing alcohol do you have on a typical day you are drinking?  0 Filed at: 01/14/2025 1512   3a. Male UNDER 65: How often do you have five or more drinks on one occasion? 0 Filed at: 01/14/2025 1512   3b. FEMALE Any Age, or MALE 65+: How often do you have 4 or more drinks on one occassion? 0 Filed at: 01/14/2025 1512   Audit-C Score 0 Filed at: 01/14/2025 1512    DON: How many times in the past year have you...    Used an illegal drug or used a prescription medication for non-medical reasons? Never Filed at: 01/14/2025 1512                            History of Present Illness       Chief Complaint   Patient presents with    Burn     Pt was carrying boiling water and tripped and spilled water on left foot. Pt reports taking ASA x1 hour PTA and triple antibiotic cream. Pt reports accident happened around 1300 today       Past Medical History:   Diagnosis Date    Anxiety     Depression       History reviewed. No pertinent surgical history.   Family History   Problem Relation Age of Onset    Diabetes Mother     Stroke Mother     Hypertension Mother     Rheum arthritis Mother     Heart disease Father         cardiac disorder    Cancer Father     Diabetes Brother     Colon polyps Maternal Uncle     Other Family         CVA-due to other mechanism    Arthritis Family     Heart disease Family         cardiac disorder    Diabetes Family     Hypertension Family     Thyroid disease Family     Neuropathy Family         Generalized    Substance Abuse Neg Hx     Mental illness Neg Hx     Colon cancer Neg Hx       Social History     Tobacco Use    Smoking status: Every Day     Current packs/day: 0.50     Types: Cigarettes    Smokeless tobacco: Never   Vaping Use    Vaping status: Never Used   Substance Use Topics    Alcohol use: No    Drug use: No      E-Cigarette/Vaping    E-Cigarette Use Never User       E-Cigarette/Vaping Substances    Nicotine No     THC No     CBD No     Flavoring No       I have reviewed and agree with the history as documented.       Burn    Patient is a 50 y/o M that presents to the ED with burn to left foot that occurred 2 hours ago.  He states he was carrying hot water, tripped and the water dropped on his left foot.  He was wearing sliders with socks.  He has burn to 2nd-5th toes.  He applied antibiotic ointment to his foot.  Last tetanus is unknown.       Review of Systems   Constitutional:  Negative for chills and fever.   Skin:  Positive for wound (burn left foot).   Neurological:  Negative for weakness.   Psychiatric/Behavioral:  Negative for confusion.    All other systems reviewed and are negative.          Objective       ED Triage Vitals   Temperature Pulse Blood Pressure Respirations SpO2 Patient Position - Orthostatic VS   01/14/25 1449 01/14/25 1449 01/14/25 1451 01/14/25 1449 01/14/25 1449 01/14/25 1449   97.6 °F (36.4 °C) 66 126/72 18 96 % Sitting      Temp Source Heart Rate Source BP Location FiO2 (%) Pain Score    01/14/25 1449 01/14/25 1449 01/14/25 1449 -- 01/14/25 1449    Temporal Monitor Right arm  7      Vitals      Date and Time Temp Pulse SpO2 Resp BP Pain Score FACES Pain Rating User   01/14/25 1511 -- -- -- -- -- 7 -- KB   01/14/25 1451 -- -- -- -- 126/72 -- -- CM   01/14/25 1449 97.6 °F (36.4 °C) 66 96 % 18 -- 7 -- CM            Physical Exam  Vitals and nursing note reviewed.   Constitutional:       General: He is not in acute distress.     Appearance: Normal appearance. He is well-developed and well-groomed. He is not ill-appearing or diaphoretic.   HENT:      Head: Normocephalic and atraumatic.   Eyes:      Conjunctiva/sclera: Conjunctivae normal.   Cardiovascular:      Rate and Rhythm: Normal rate and regular rhythm.      Pulses:           Dorsalis pedis pulses are 2+ on the left side.      Heart sounds: Normal heart sounds.   Pulmonary:      Effort: Pulmonary effort is normal.      Breath sounds: Normal breath sounds.   Musculoskeletal:      Cervical back: Normal range of motion.   Feet:      Comments: 1st degree burn to dorsal left 5th toe.  2nd degree burn to dorsal left 4th-2nd toe.   Burn is less than 1%.    Skin:     General: Skin is warm and dry.      Findings: Burn (left foot. refer to picture.) present.   Neurological:      Mental Status: He is alert.   Psychiatric:         Behavior: Behavior is cooperative.         Results  Reviewed       None            No orders to display       Procedures    ED Medication and Procedure Management   Prior to Admission Medications   Prescriptions Last Dose Informant Patient Reported? Taking?   DULoxetine (Cymbalta) 30 mg delayed release capsule   No No   Sig: Take 1 capsule (30 mg total) by mouth daily   Patient not taking: Reported on 3/16/2023   Sofosbuvir-Velpatasvir (Epclusa) 400-100 MG tablet   No No   Sig: Take 1 tablet by mouth daily   chlorhexidine (HIBICLENS) 4 % external liquid   No No   Sig: Apply 5 application. topically daily as needed for wound care   hydrOXYzine HCL (ATARAX) 50 mg tablet   No No   Sig: Take 1 tablet (50 mg total) by mouth 3 (three) times a day as needed for anxiety   Patient not taking: Reported on 3/16/2023   meloxicam (MOBIC) 15 mg tablet   No No   Sig: Take 1 tablet (15 mg total) by mouth daily   Patient not taking: Reported on 3/16/2023   tadalafil (CIALIS) 10 MG tablet  Self No No   Sig: Take 1 tablet (10 mg total) by mouth daily as needed for erectile dysfunction   Patient not taking: Reported on 3/16/2023      Facility-Administered Medications: None     Discharge Medication List as of 1/14/2025  3:07 PM        CONTINUE these medications which have NOT CHANGED    Details   chlorhexidine (HIBICLENS) 4 % external liquid Apply 5 application. topically daily as needed for wound care, Starting Sun 5/14/2023, Normal      DULoxetine (Cymbalta) 30 mg delayed release capsule Take 1 capsule (30 mg total) by mouth daily, Starting Thu 1/5/2023, Normal      hydrOXYzine HCL (ATARAX) 50 mg tablet Take 1 tablet (50 mg total) by mouth 3 (three) times a day as needed for anxiety, Starting Thu 1/5/2023, Normal      meloxicam (MOBIC) 15 mg tablet Take 1 tablet (15 mg total) by mouth daily, Starting Thu 1/5/2023, Normal      Sofosbuvir-Velpatasvir (Epclusa) 400-100 MG tablet Take 1 tablet by mouth daily, Starting Sat 4/8/2023, Until Sat 7/1/2023, Normal      tadalafil (CIALIS) 10 MG  tablet Take 1 tablet (10 mg total) by mouth daily as needed for erectile dysfunction, Starting Mon 10/31/2022, Normal           No discharge procedures on file.  ED SEPSIS DOCUMENTATION   Time reflects when diagnosis was documented in both MDM as applicable and the Disposition within this note       Time User Action Codes Description Comment    1/14/2025  3:05 PM Alyssa Lara Add [T25.232A] Second degree burn of fourth toe of left foot     1/14/2025  3:05 PM Alyssa Lara Add [T25.232A] Second degree burn of third toe of left foot     1/14/2025  3:05 PM Alyssa Lara Add [T25.232A] Second degree burn of second toe of left foot     1/14/2025  3:05 PM Alyssa Lara Add [T25.132A] First degree burn of toe of left foot     1/14/2025  3:05 PM Alyssa Lara Modify [T25.132A] First degree burn of toe of left foot 5th toe                 Alyssa Lara PA-C  01/14/25 1557

## 2025-03-22 ENCOUNTER — OFFICE VISIT (OUTPATIENT)
Dept: URGENT CARE | Facility: CLINIC | Age: 50
End: 2025-03-22
Payer: COMMERCIAL

## 2025-03-22 VITALS
OXYGEN SATURATION: 99 % | HEIGHT: 69 IN | BODY MASS INDEX: 31.16 KG/M2 | DIASTOLIC BLOOD PRESSURE: 95 MMHG | WEIGHT: 210.4 LBS | SYSTOLIC BLOOD PRESSURE: 131 MMHG | TEMPERATURE: 98.7 F | RESPIRATION RATE: 18 BRPM | HEART RATE: 92 BPM

## 2025-03-22 DIAGNOSIS — L03.90 CELLULITIS: Primary | ICD-10-CM

## 2025-03-22 PROCEDURE — S9083 URGENT CARE CENTER GLOBAL: HCPCS

## 2025-03-22 PROCEDURE — G0382 LEV 3 HOSP TYPE B ED VISIT: HCPCS

## 2025-03-22 RX ORDER — CHLORHEXIDINE GLUCONATE 40 MG/ML
1 SOLUTION TOPICAL DAILY PRN
Qty: 118 ML | Refills: 0 | Status: SHIPPED | OUTPATIENT
Start: 2025-03-22

## 2025-03-22 RX ORDER — DOXYCYCLINE 100 MG/1
100 CAPSULE ORAL EVERY 12 HOURS SCHEDULED
Qty: 14 CAPSULE | Refills: 0 | Status: SHIPPED | OUTPATIENT
Start: 2025-03-22 | End: 2025-03-29

## 2025-03-22 NOTE — PROGRESS NOTES
[facility]        NAME: Jonathan Mayer is a 50 y.o. male  : 1975    MRN: [id number]  DATE: 2025  TIME: 6:23 PM    Assessment and Plan   Cellulitis [L03.90]  1. Cellulitis  doxycycline hyclate (VIBRAMYCIN) 100 mg capsule    chlorhexidine gluconate (HIBICLENS) 4 % external liquid            Patient Instructions     Take doxycycline as prescribed.  Complete the entire course of antibiotics - even if you are feeling better.     Hibiclens wash daily.  Continue bacitracin / antibiotic ointment twice daily.  Continue warm compresses.    Return to [facility] or go to ER for worsening redness, red line streaking, warmth, swelling, pain, pus drainage, or if you develop fevers/chills.    Follow up with your PCP in 3-5 days.     If tests are performed, our office will contact you with results only if changes need to made to the care plan discussed with you at the visit. You can review your full results on [facility] Mychart.      Chief Complaint     Chief Complaint   Patient presents with    Abscess     Ingrown hair on Tuesday on back from shaving it and it was itchy at first and is painful now.           History of Present Illness       50-year-old male who presents for evaluation of recurrent skin cellulitis presents w/ skin infection that started four days ago on his back after significant other shaved the area. Starting to become more red and painful. No open or draining areas. No known fevers/chills. Patient states he had a similar occurrence two years ago which resolved with doxycycline. Used a Hibiclens wash which he believes was helpful for him.         Review of Systems   Review of Systems   Constitutional:  Negative for chills, diaphoresis, fatigue and fever.   Respiratory:  Negative for shortness of breath.    Cardiovascular:  Negative for chest pain.   Skin:  Positive for rash.         Current Medications       Current Outpatient Medications:     chlorhexidine gluconate (HIBICLENS) 4  % external liquid, Apply 1 Application topically daily as needed for wound care, Disp: 118 mL, Rfl: 0    doxycycline hyclate (VIBRAMYCIN) 100 mg capsule, Take 1 capsule (100 mg total) by mouth every 12 (twelve) hours for 7 days, Disp: 14 capsule, Rfl: 0    DULoxetine (Cymbalta) 30 mg delayed release capsule, Take 1 capsule (30 mg total) by mouth daily (Patient not taking: Reported on 3/16/2023), Disp: 90 capsule, Rfl: 3    hydrOXYzine HCL (ATARAX) 50 mg tablet, Take 1 tablet (50 mg total) by mouth 3 (three) times a day as needed for anxiety (Patient not taking: Reported on 3/16/2023), Disp: 90 tablet, Rfl: 3    meloxicam (MOBIC) 15 mg tablet, Take 1 tablet (15 mg total) by mouth daily (Patient not taking: Reported on 3/16/2023), Disp: 90 tablet, Rfl: 3    Sofosbuvir-Velpatasvir (Epclusa) 400-100 MG tablet, Take 1 tablet by mouth daily, Disp: 28 tablet, Rfl: 2    tadalafil (CIALIS) 10 MG tablet, Take 1 tablet (10 mg total) by mouth daily as needed for erectile dysfunction (Patient not taking: Reported on 3/16/2023), Disp: 10 tablet, Rfl: 0    Current Allergies     Allergies as of 03/22/2025    (No Known Allergies)            The following portions of the patient's history were reviewed and updated as appropriate: allergies, current medications, past family history, past medical history, past social history, past surgical history and problem list.     Past Medical History:   Diagnosis Date    Anxiety     Depression        History reviewed. No pertinent surgical history.    Family History   Problem Relation Age of Onset    Diabetes Mother     Stroke Mother     Hypertension Mother     Rheum arthritis Mother     Heart disease Father         cardiac disorder    Cancer Father     Diabetes Brother     Colon polyps Maternal Uncle     Other Family         CVA-due to other mechanism    Arthritis Family     Heart disease Family         cardiac disorder    Diabetes Family     Hypertension Family     Thyroid disease Family      "Neuropathy Family         Generalized    Substance Abuse Neg Hx     Mental illness Neg Hx     Colon cancer Neg Hx          Medications have been verified.        Objective   /95   Pulse 92   Temp 98.7 °F (37.1 °C)   Resp 18   Ht 5' 9\" (1.753 m)   Wt 95.4 kg (210 lb 6.4 oz)   SpO2 99%   BMI 31.07 kg/m²        Physical Exam     Physical Exam  Vitals and nursing note reviewed.   Constitutional:       General: He is not in acute distress.     Appearance: He is not ill-appearing.   HENT:      Head: Normocephalic and atraumatic.      Mouth/Throat:      Mouth: Mucous membranes are moist.   Cardiovascular:      Rate and Rhythm: Normal rate.   Pulmonary:      Effort: Pulmonary effort is normal.   Musculoskeletal:         General: Normal range of motion.      Cervical back: Normal range of motion and neck supple.   Skin:     General: Skin is warm and dry.      Findings: Erythema present.          Neurological:      Mental Status: He is alert and oriented to person, place, and time.   Psychiatric:         Attention and Perception: He is inattentive.         Mood and Affect: Mood normal.         Speech: Speech is rapid and pressured.         Behavior: Behavior is cooperative.         Thought Content: Thought content normal.                   "

## 2025-03-22 NOTE — PATIENT INSTRUCTIONS
Take doxycycline as prescribed.  Complete the entire course of antibiotics - even if you are feeling better.     Hibiclens wash daily.  Continue bacitracin / antibiotic ointment twice daily.  Continue warm compresses.    Return to [facility] or go to ER for worsening redness, red line streaking, warmth, swelling, pain, pus drainage, or if you develop fevers/chills.    Follow up with your PCP in 3-5 days.